# Patient Record
Sex: FEMALE | Race: WHITE | Employment: OTHER | URBAN - METROPOLITAN AREA
[De-identification: names, ages, dates, MRNs, and addresses within clinical notes are randomized per-mention and may not be internally consistent; named-entity substitution may affect disease eponyms.]

---

## 2017-10-25 PROBLEM — F33.1 MODERATE EPISODE OF RECURRENT MAJOR DEPRESSIVE DISORDER (HCC): Status: ACTIVE | Noted: 2017-10-25

## 2017-10-25 PROBLEM — F33.1 MODERATE EPISODE OF RECURRENT MAJOR DEPRESSIVE DISORDER (HCC): Status: RESOLVED | Noted: 2017-10-25 | Resolved: 2017-10-25

## 2017-11-15 ENCOUNTER — HOSPITAL ENCOUNTER (OUTPATIENT)
Dept: GENERAL RADIOLOGY | Age: 82
Discharge: HOME | End: 2017-11-15
Attending: NURSE PRACTITIONER
Payer: MEDICARE
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
## 2017-11-15 DIAGNOSIS — R07.81 RIB PAIN ON LEFT SIDE: ICD-10-CM

## 2017-11-15 DIAGNOSIS — J20.9 ACUTE BRONCHITIS, UNSPECIFIED ORGANISM: ICD-10-CM

## 2017-11-15 PROCEDURE — 71101 X-RAY EXAM UNILAT RIBS/CHEST: CPT

## 2018-01-12 ENCOUNTER — HOSPITAL ENCOUNTER (EMERGENCY)
Age: 83
Discharge: HOME | End: 2018-01-12
Attending: EMERGENCY MEDICINE
Payer: MEDICARE

## 2018-01-12 ENCOUNTER — APPOINTMENT (OUTPATIENT)
Dept: CT IMAGING | Age: 83
End: 2018-01-12
Attending: EMERGENCY MEDICINE
Payer: MEDICARE

## 2018-01-12 ENCOUNTER — APPOINTMENT (OUTPATIENT)
Dept: CT IMAGING | Age: 83
End: 2018-01-12
Payer: MEDICARE

## 2018-01-12 VITALS
SYSTOLIC BLOOD PRESSURE: 218 MMHG | TEMPERATURE: 98.2 F | DIASTOLIC BLOOD PRESSURE: 86 MMHG | BODY MASS INDEX: 40.48 KG/M2 | HEART RATE: 54 BPM | RESPIRATION RATE: 17 BRPM | HEIGHT: 62 IN | OXYGEN SATURATION: 93 % | WEIGHT: 220 LBS

## 2018-01-12 DIAGNOSIS — R19.09 ABDOMINAL MASS OF OTHER SITE: ICD-10-CM

## 2018-01-12 DIAGNOSIS — K42.0 IRREDUCIBLE UMBILICAL HERNIA: Primary | ICD-10-CM

## 2018-01-12 PROBLEM — E66.01 OBESITY, MORBID (HCC): Status: ACTIVE | Noted: 2018-01-12

## 2018-01-12 LAB
ALBUMIN SERPL-MCNC: 3.2 G/DL (ref 3.2–4.6)
ALBUMIN/GLOB SERPL: 0.8 {RATIO} (ref 1.2–3.5)
ALP SERPL-CCNC: 73 U/L (ref 50–136)
ALT SERPL-CCNC: 19 U/L (ref 12–65)
ANION GAP SERPL CALC-SCNC: 9 MMOL/L (ref 7–16)
AST SERPL-CCNC: 47 U/L (ref 15–37)
BASOPHILS # BLD: 0 K/UL (ref 0–0.2)
BASOPHILS NFR BLD: 0 % (ref 0–2)
BILIRUB SERPL-MCNC: 0.3 MG/DL (ref 0.2–1.1)
BUN SERPL-MCNC: 24 MG/DL (ref 8–23)
CALCIUM SERPL-MCNC: 8.4 MG/DL (ref 8.3–10.4)
CHLORIDE SERPL-SCNC: 106 MMOL/L (ref 98–107)
CO2 SERPL-SCNC: 30 MMOL/L (ref 21–32)
CREAT SERPL-MCNC: 1.33 MG/DL (ref 0.6–1)
DIFFERENTIAL METHOD BLD: ABNORMAL
EOSINOPHIL # BLD: 0.2 K/UL (ref 0–0.8)
EOSINOPHIL NFR BLD: 2 % (ref 0.5–7.8)
ERYTHROCYTE [DISTWIDTH] IN BLOOD BY AUTOMATED COUNT: 16.9 % (ref 11.9–14.6)
GLOBULIN SER CALC-MCNC: 3.9 G/DL (ref 2.3–3.5)
GLUCOSE SERPL-MCNC: 98 MG/DL (ref 65–100)
HCT VFR BLD AUTO: 40.9 % (ref 35.8–46.3)
HGB BLD-MCNC: 12.5 G/DL (ref 11.7–15.4)
IMM GRANULOCYTES # BLD: 0.1 K/UL (ref 0–0.5)
IMM GRANULOCYTES NFR BLD AUTO: 1 % (ref 0–5)
LYMPHOCYTES # BLD: 2.1 K/UL (ref 0.5–4.6)
LYMPHOCYTES NFR BLD: 21 % (ref 13–44)
MCH RBC QN AUTO: 27.6 PG (ref 26.1–32.9)
MCHC RBC AUTO-ENTMCNC: 30.6 G/DL (ref 31.4–35)
MCV RBC AUTO: 90.3 FL (ref 79.6–97.8)
MONOCYTES # BLD: 0.8 K/UL (ref 0.1–1.3)
MONOCYTES NFR BLD: 8 % (ref 4–12)
NEUTS SEG # BLD: 6.9 K/UL (ref 1.7–8.2)
NEUTS SEG NFR BLD: 68 % (ref 43–78)
PLATELET # BLD AUTO: 319 K/UL (ref 150–450)
PMV BLD AUTO: 10.2 FL (ref 10.8–14.1)
POTASSIUM SERPL-SCNC: 4.1 MMOL/L (ref 3.5–5.1)
PROT SERPL-MCNC: 7.1 G/DL (ref 6.3–8.2)
RBC # BLD AUTO: 4.53 M/UL (ref 4.05–5.25)
SODIUM SERPL-SCNC: 145 MMOL/L (ref 136–145)
WBC # BLD AUTO: 10.2 K/UL (ref 4.3–11.1)

## 2018-01-12 PROCEDURE — 74011250636 HC RX REV CODE- 250/636: Performed by: EMERGENCY MEDICINE

## 2018-01-12 PROCEDURE — 96376 TX/PRO/DX INJ SAME DRUG ADON: CPT | Performed by: EMERGENCY MEDICINE

## 2018-01-12 PROCEDURE — 96374 THER/PROPH/DIAG INJ IV PUSH: CPT | Performed by: EMERGENCY MEDICINE

## 2018-01-12 PROCEDURE — 99284 EMERGENCY DEPT VISIT MOD MDM: CPT | Performed by: EMERGENCY MEDICINE

## 2018-01-12 PROCEDURE — 74011636320 HC RX REV CODE- 636/320: Performed by: EMERGENCY MEDICINE

## 2018-01-12 PROCEDURE — 74176 CT ABD & PELVIS W/O CONTRAST: CPT

## 2018-01-12 PROCEDURE — 74011636637 HC RX REV CODE- 636/637: Performed by: EMERGENCY MEDICINE

## 2018-01-12 PROCEDURE — A9270 NON-COVERED ITEM OR SERVICE: HCPCS | Performed by: EMERGENCY MEDICINE

## 2018-01-12 PROCEDURE — 74011250637 HC RX REV CODE- 250/637: Performed by: EMERGENCY MEDICINE

## 2018-01-12 PROCEDURE — 85025 COMPLETE CBC W/AUTO DIFF WBC: CPT

## 2018-01-12 PROCEDURE — 80053 COMPREHEN METABOLIC PANEL: CPT

## 2018-01-12 PROCEDURE — 96361 HYDRATE IV INFUSION ADD-ON: CPT | Performed by: EMERGENCY MEDICINE

## 2018-01-12 RX ORDER — ONDANSETRON 2 MG/ML
4 INJECTION INTRAMUSCULAR; INTRAVENOUS
Status: COMPLETED | OUTPATIENT
Start: 2018-01-12 | End: 2018-01-12

## 2018-01-12 RX ORDER — PREDNISONE 20 MG/1
40 TABLET ORAL
Status: COMPLETED | OUTPATIENT
Start: 2018-01-12 | End: 2018-01-12

## 2018-01-12 RX ORDER — METOPROLOL TARTRATE 50 MG/1
50 TABLET ORAL
Status: COMPLETED | OUTPATIENT
Start: 2018-01-12 | End: 2018-01-12

## 2018-01-12 RX ORDER — ONDANSETRON 8 MG/1
8 TABLET, ORALLY DISINTEGRATING ORAL
Qty: 10 TAB | Refills: 0 | Status: SHIPPED | OUTPATIENT
Start: 2018-01-12 | End: 2018-04-20

## 2018-01-12 RX ADMIN — METOPROLOL TARTRATE 50 MG: 50 TABLET ORAL at 21:17

## 2018-01-12 RX ADMIN — ONDANSETRON 4 MG: 2 INJECTION INTRAMUSCULAR; INTRAVENOUS at 17:16

## 2018-01-12 RX ADMIN — PREDNISONE 40 MG: 20 TABLET ORAL at 19:45

## 2018-01-12 RX ADMIN — SODIUM CHLORIDE 1000 ML: 900 INJECTION, SOLUTION INTRAVENOUS at 14:33

## 2018-01-12 RX ADMIN — DIATRIZOATE MEGLUMINE AND DIATRIZOATE SODIUM 15 ML: 660; 100 LIQUID ORAL; RECTAL at 17:06

## 2018-01-12 RX ADMIN — ONDANSETRON 4 MG: 2 INJECTION INTRAMUSCULAR; INTRAVENOUS at 19:45

## 2018-01-12 RX ADMIN — ONDANSETRON 4 MG: 2 INJECTION INTRAMUSCULAR; INTRAVENOUS at 14:34

## 2018-01-12 NOTE — ED TRIAGE NOTES
Pt sent from PCP for incarcerated umbilical hernia. Pt c/o bulge at umbilicus and states it feels hot with fever. She has a known hernia but it has always been reduced but not today. Pt is accompanied by her home care RN.

## 2018-01-12 NOTE — ED PROVIDER NOTES
HPI Comments: Patient states abdominal soreness and nausea. Caregivers report her umbilical hernia has gotten larger the last 1 1/2 weeks and now is not reducible. Went to her PCP today and was sent for incarcerated hernia. Patient denies vomiting. Ate at 1 pm today. No diarrhea. No fever. She denies any history of GYN disease. Has had a cholecystectomy. Patient is a 80 y.o. female presenting with abdominal pain. The history is provided by the patient, medical records and a caregiver. Abdominal Pain    This is a new problem. The pain is located in the generalized abdominal region. Associated symptoms include nausea and arthralgias. Pertinent negatives include no fever, no diarrhea, no vomiting, no dysuria, no frequency and no chest pain. Nothing worsens the pain. The pain is relieved by nothing. The patient's surgical history includes cholecystectomy. Past Medical History:   Diagnosis Date    Coronary atherosclerosis of unspecified type of vessel, native or graft 8/19/2015    Esophageal reflux 8/19/2015    Gastrointestinal disorder     Gout 8/19/2015    Hypertension     Other and unspecified hyperlipidemia 8/19/2015    Other ill-defined conditions(799.89)     palpitations    Pure hypercholesterolemia 8/19/2015       Past Surgical History:   Procedure Laterality Date    HX CHOLECYSTECTOMY      HX HEENT           Family History:   Problem Relation Age of Onset    Heart Disease Mother     Arthritis-osteo Father        Social History     Social History    Marital status:      Spouse name: N/A    Number of children: N/A    Years of education: N/A     Occupational History    Not on file.      Social History Main Topics    Smoking status: Former Smoker    Smokeless tobacco: Never Used    Alcohol use No    Drug use: No    Sexual activity: Not on file     Other Topics Concern    Not on file     Social History Narrative         ALLERGIES: Gold au 198 and Sulfa (sulfonamide antibiotics)    Review of Systems   Constitutional: Negative for appetite change, chills and fever. Respiratory: Negative. Cardiovascular: Positive for leg swelling (chronic). Negative for chest pain. Gastrointestinal: Positive for abdominal pain and nausea. Negative for diarrhea and vomiting. Genitourinary: Negative. Negative for dysuria and frequency. Musculoskeletal: Positive for arthralgias and gait problem. Skin: Negative. Neurological: Negative for weakness. Hematological: Negative. Psychiatric/Behavioral: Negative. Vitals:    01/12/18 1326 01/12/18 1357   BP: 140/53    Pulse: 61    Resp: 17    Temp: 98.2 °F (36.8 °C)    SpO2: 93% 93%   Weight: 99.8 kg (220 lb)    Height: 5' 2\" (1.575 m)             Physical Exam   Constitutional: She is oriented to person, place, and time. She appears well-developed and well-nourished. HENT:   Head: Normocephalic and atraumatic. Mouth/Throat: Oropharynx is clear and moist.   Eyes: Conjunctivae and EOM are normal. Pupils are equal, round, and reactive to light. No scleral icterus. Neck: Normal range of motion. Neck supple. No JVD present. Cardiovascular: Normal rate, regular rhythm, normal heart sounds and intact distal pulses. Pulmonary/Chest: Effort normal and breath sounds normal.   Abdominal: She exhibits mass. There is tenderness. Abdomen with umbilical hernia - blue discoloration 5 cm. Underlying mass lower abdomen and periumbilical area 20 cm. Firm. Some tenderness with no guarding or rebound. Musculoskeletal:   Chronic edema lower extremities. Neurological: She is alert and oriented to person, place, and time. Skin: Skin is warm and dry. Psychiatric: She has a normal mood and affect. Her behavior is normal.   Nursing note and vitals reviewed.        MDM  Number of Diagnoses or Management Options  Abdominal mass of other site:   Irreducible umbilical hernia:   Diagnosis management comments: Care turned over to me pending CT scan with contrast.  Please see Dr. Mayra Nur note for details of presentation. CT scan was obtained and shows no evidence of obstruction or incarcerated hernia. The material in the umbilical hernia appears to be related to a large pelvic mass which is suspicious for ovarian carcinoma. Patient will be given referral to OB/GYN. This has already been arranged by Dr. Clara Maloney. Amount and/or Complexity of Data Reviewed  Tests in the radiology section of CPT®: ordered and reviewed  Independent visualization of images, tracings, or specimens: yes    Risk of Complications, Morbidity, and/or Mortality  Presenting problems: moderate  Diagnostic procedures: moderate  Management options: moderate      ED Course       Procedures    Abdominal mass  Umbilical hernia  CT - large cystic mass and umbilical hernia. Unclear if incarcerated bowel. Discussed with Dr. Melanie Ramirez - repeat scan with oral contrast. Will admit if obstructed. Requests GYN consult as well for outpatient evaluation of mass. Discussed with Dr. Jordy Palma. She will follow up in office if patient not admitted.    Labs reviewed  Zofran 4 mg IV x 2  Oral contrast.   Care signed to Dr. Nadine Sanford

## 2018-01-13 NOTE — ED NOTES
MD made aware of pt's asymptomatic blood pressure at discharge. MD ordered Lopressor and it was given to the pt prior to discharge.

## 2018-01-13 NOTE — DISCHARGE INSTRUCTIONS
Ovarian Cancer: Care Instructions  Your Care Instructions  Ovarian cancer occurs when abnormal cells grow out of control in or near your ovaries. These cells may spread to other areas in the body. The ovaries are the organs that hold and release a woman's eggs. Treatment usually involves surgery to remove the ovaries. Chemotherapy may also be used. Radiation therapy is rarely used, but in some cases it might be part of treatment. You also may get medicines to help with the side effects of chemotherapy, which may include nausea, vomiting, and fatigue. Finding out that you have cancer is scary. You may feel many emotions and may need some help coping. Seek out family, friends, and counselors for support. You can do things at home to make yourself feel better while you go through treatment. You also can call the [facility] (5-324.614.2371) or visit its website at [employer] for more information. Follow-up care is a key part of your treatment and safety. Be sure to make and go to all appointments, and call your doctor if you are having problems. It's also a good idea to know your test results and keep a list of the medicines you take. How can you care for yourself at home? · Take your medicines exactly as prescribed. Call your doctor if you think you are having a problem with your medicine. You may get medicine for nausea and vomiting if you have these side effects. · Eat healthy food. If you do not feel like eating, try to eat food that has protein and extra calories to keep up your strength and prevent weight loss. Drink liquid meal replacements for extra calories and protein. Try to eat your main meal early. Eating smaller portions more often may help as well. · Get some physical activity every day, but do not get too tired. Keep doing the hobbies you enjoy as your energy allows. · Take steps to control your stress and workload. Learn relaxation techniques. ¨ Share your feelings.  Stress and tension affect our emotions. By expressing your feelings to others, you may be able to understand and cope with them. ¨ Consider joining a support group. Talking about a problem with your spouse, a good friend, or other people with similar problems is a good way to reduce tension and stress. ¨ Express yourself through art. Try writing, dance, art, or crafts to relieve tension. Some dance, writing, or art groups may be available just for people who have cancer. ¨ Be kind to your body and mind. Getting enough sleep, eating a healthy diet, and taking time to do things you enjoy can contribute to an overall feeling of balance in your life and help reduce stress. ¨ Get help if you need it. Discuss your concerns with your doctor or counselor. · If you are vomiting or have diarrhea:  ¨ Drink plenty of fluids (enough so that your urine is light yellow or clear like water) to prevent dehydration. Choose water and other caffeine-free clear liquids. If you have kidney, heart, or liver disease and have to limit fluids, talk with your doctor before you increase the amount of fluids you drink. ¨ When you are able to eat, try clear soups, mild foods, and liquids until all symptoms are gone for 12 to 48 hours. Other good choices include dry toast, crackers, cooked cereal, and gelatin dessert, such as Jell-O.  · Take care of your urinary tract to prevent problems such as infection, which can be caused by ovarian cancer and its treatment. Limit drinks with caffeine, drink plenty of fluids, and urinate every 3 or 4 hours. · If you have not already done so, prepare a list of advance directives. Advance directives are instructions to your doctor and family members about what kind of care you want if you become unable to speak or express yourself. When should you call for help? Call 911 anytime you think you may need emergency care. For example, call if:  ? · You passed out (lost consciousness).    ?Call your doctor now or seek immediate medical care if:  ? · You have a fever. ? · You have abnormal bleeding. ? · You have new or worse pain. ? · You think you have an infection. ? · You have new symptoms, such as a cough, belly pain, vomiting, diarrhea, or a rash. ? Watch closely for changes in your health, and be sure to contact your doctor if:  ? · You are much more tired than usual.   ? · You have swollen glands in your armpits, groin, or neck. ? · You do not get better as expected. Where can you learn more? Go to http://ena-bobby.info/. Enter D145 in the search box to learn more about \"Ovarian Cancer: Care Instructions. \"  Current as of: May 12, 2017  Content Version: 11.4  © 4613-9494 [employer]. Care instructions adapted under license by [employer] (which disclaims liability or warranty for this information). If you have questions about a medical condition or this instruction, always ask your healthcare professional. [address] any warranty or liability for your use of this information.

## 2018-01-13 NOTE — ED NOTES
I have reviewed discharge instructions with the patient. The patient verbalized understanding. Patient left ED via Discharge Method: wheelchair to Home with family. Opportunity for questions and clarification provided. Patient given 1 script. To continue your aftercare when you leave the hospital, you may receive an automated call from our care team to check in on how you are doing. This is a free service and part of our promise to provide the best care and service to meet your aftercare needs.  If you have questions, or wish to unsubscribe from this service please call [phone]. Thank you for Choosing our [facility] Emergency Department.

## 2018-01-22 PROBLEM — R19.00 PELVIC MASS: Status: ACTIVE | Noted: 2018-01-22

## 2018-01-24 ENCOUNTER — HOSPITAL ENCOUNTER (OUTPATIENT)
Dept: LAB | Age: 83
Discharge: HOME | End: 2018-01-24
Payer: MEDICARE

## 2018-01-24 DIAGNOSIS — R60.9 EDEMA, UNSPECIFIED TYPE: ICD-10-CM

## 2018-01-24 PROBLEM — Z01.810 PRE-OPERATIVE CARDIOVASCULAR EXAMINATION: Status: ACTIVE | Noted: 2018-01-24

## 2018-01-24 PROBLEM — R01.1 SYSTOLIC MURMUR: Status: ACTIVE | Noted: 2018-01-24

## 2018-01-24 PROBLEM — F03.90 DEMENTIA WITHOUT BEHAVIORAL DISTURBANCE (HCC): Status: ACTIVE | Noted: 2018-01-24

## 2018-01-24 PROBLEM — R19.00 ABDOMINAL LUMP: Status: ACTIVE | Noted: 2018-01-24

## 2018-01-24 LAB — BNP SERPL-MCNC: 37 PG/ML

## 2018-01-24 PROCEDURE — 36415 COLL VENOUS BLD VENIPUNCTURE: CPT | Performed by: INTERNAL MEDICINE

## 2018-01-24 PROCEDURE — 83880 ASSAY OF NATRIURETIC PEPTIDE: CPT | Performed by: INTERNAL MEDICINE

## 2018-02-22 ENCOUNTER — HOSPITAL ENCOUNTER (OUTPATIENT)
Dept: CT IMAGING | Age: 83
Discharge: HOME | End: 2018-02-22
Payer: MEDICARE

## 2018-02-22 VITALS
TEMPERATURE: 98.4 F | SYSTOLIC BLOOD PRESSURE: 150 MMHG | DIASTOLIC BLOOD PRESSURE: 73 MMHG | BODY MASS INDEX: 39.2 KG/M2 | RESPIRATION RATE: 18 BRPM | HEIGHT: 62 IN | HEART RATE: 80 BPM | OXYGEN SATURATION: 98 % | WEIGHT: 213 LBS

## 2018-02-22 DIAGNOSIS — R19.00 PELVIC MASS IN FEMALE: ICD-10-CM

## 2018-02-22 PROCEDURE — 77012 CT SCAN FOR NEEDLE BIOPSY: CPT

## 2018-02-22 PROCEDURE — 88305 TISSUE EXAM BY PATHOLOGIST: CPT | Performed by: OBSTETRICS & GYNECOLOGY

## 2018-02-22 PROCEDURE — 74011000250 HC RX REV CODE- 250: Performed by: RADIOLOGY

## 2018-02-22 PROCEDURE — 88173 CYTOPATH EVAL FNA REPORT: CPT | Performed by: OBSTETRICS & GYNECOLOGY

## 2018-02-22 RX ORDER — LIDOCAINE HYDROCHLORIDE 20 MG/ML
20-200 INJECTION, SOLUTION INFILTRATION; PERINEURAL
Status: DISCONTINUED | OUTPATIENT
Start: 2018-02-22 | End: 2018-02-26 | Stop reason: HOSPADM

## 2018-02-22 RX ORDER — SODIUM CHLORIDE 9 MG/ML
25 INJECTION, SOLUTION INTRAVENOUS CONTINUOUS
Status: DISCONTINUED | OUTPATIENT
Start: 2018-02-22 | End: 2018-02-26 | Stop reason: HOSPADM

## 2018-02-22 RX ADMIN — SODIUM BICARBONATE 2 ML: 0.2 INJECTION, SOLUTION INTRAVENOUS at 12:35

## 2018-02-22 RX ADMIN — LIDOCAINE HYDROCHLORIDE 160 MG: 20 INJECTION, SOLUTION INFILTRATION; PERINEURAL at 12:35

## 2018-02-22 NOTE — PROCEDURES
Date of Procedure: 2/22/2018    Pre-Procedure Diagnosis: Large cystic ovarian mass causing discomfort. Patient presents for palliative CAT scan guided drainage. Post-Procedure Diagnosis: SAME    Procedure(s): CAT scan guided drainage of the large pelvic cystic mass. Brief Description of Procedure: As above    Performed By: Barrera De Paz MD     Assistants: None    Anesthesia: Local    Estimated Blood Loss: Minimal    Specimens: Thin brown fluid sample sent to laboratory for cytology    Implants: None    Findings: 2.8 L of fluid was removed.     Complications: None    Recommendations: None    Follow Up: None

## 2018-02-22 NOTE — DISCHARGE INSTRUCTIONS
[address]  Department of Interventional Radiology  [facility]  [phone] Office  [phone] Fax    FLUID ASPIRATION DISCHARGE INSTRUCTIONS    General Information:  During this procedure, the doctor will insert a needle into the abdomen to drain fluid. After the procedure, you will be able to take a deep breath much easier. The site of the puncture may ooze the first day. This will decrease and eventually stop. Paracentesis (draining fluid from the abdomen) sometimes makes patients hypotensive (low blood pressure). Your doctor may order for you to receive fluids or albumin (a volume booster) during the procedure through an IV site. Home Care Instructions:  Keep the puncture site clean and dry. No tub baths or swimming until puncture site heals. Showering is acceptable. Resume your normal diet, and resume your normal activity slowly and as you tolerate. If you are short of breath, rest. If shortness of breath does not ease, please call your ordering doctor. Fluid can re-accumulate in the chest and/or in the abdomen. If this should occur, your doctor needs to know as you may need to have the procedure done again. Call If:     You should call your Physician and/or the Radiology Nurse if you notice any signs of infection, like pus draining, or if it is swollen or reddened. Also call if you have a fever, or if you are bleeding from the puncture site more than a small amount on the dressing. Call if the puncture site keeps draining fluid. Some oozing is to be expected, but should slow and then stop. Call if you feel like you have pressure in your abdomen. SEEK IMMEDIATE CARE OR CALL 911 IF YOU SUDDENLY HAVE TROUBLE BREATHING, OR IF YOUR LIPS TURN BLUE, OR IF YOU NOTICE BLOOD IN YOUR SPUTUM. Follow-Up Instructions: Please see your ordering doctor as he/she has requested. To Reach Us:     If you have any questions about your procedure, please call the Interventional Radiology department at [phone]. After business hours (5pm) and weekends, call the answering service at [phone] and ask for the Radiologist on call to be paged. Interventional Radiology General Nurse Discharge    After general anesthesia or intravenous sedation, for 24 hours or while taking prescription Narcotics:  · Limit your activities  · Do not drive and operate hazardous machinery  · Do not make important personal or business decisions  · Do  not drink alcoholic beverages  · If you have not urinated within 8 hours after discharge, please contact your surgeon on call. * Please give a list of your current medications to your Primary Care Provider. * Please update this list whenever your medications are discontinued, doses are     changed, or new medications (including over-the-counter products) are added. * Please carry medication information at all times in case of emergency situations. These are general instructions for a healthy lifestyle:    No smoking/ No tobacco products/ Avoid exposure to second hand smoke  Surgeon General's Warning:  Quitting smoking now greatly reduces serious risk to your health. Obesity, smoking, and sedentary lifestyle greatly increases your risk for illness  A healthy diet, regular physical exercise & weight monitoring are important for maintaining a healthy lifestyle    You may be retaining fluid if you have a history of heart failure or if you experience any of the following symptoms:  Weight gain of 3 pounds or more overnight or 5 pounds in a week, increased swelling in our hands or feet or shortness of breath while lying flat in bed. Please call your doctor as soon as you notice any of these symptoms; do not wait until your next office visit.     Recognize signs and symptoms of STROKE:  F-face looks uneven    A-arms unable to move or move unevenly    S-speech slurred or non-existent    T-time-call 911 as soon as signs and symptoms begin-DO NOT go       Back to bed or wait to see if you get better-TIME IS BRAIN. Date: 2/22/2018  Discharging Nurse:  Lenka Laird RN

## 2018-02-22 NOTE — PROGRESS NOTES
Discharge complete. Discharge instructions reviewed with pt. Time for questions allowed. Pt denies any questions at this time. Dressing to abd noted to be clean, dry, and intact. Pt assisted to front of hospital via [facility] accompanied by family.      Visit Vitals    /73 (BP 1 Location: Left arm, BP Patient Position: At rest;Supine)    Pulse 80    Temp 98.4 °F (36.9 °C)    Resp 18    Ht 5' 2\" (1.575 m)    Wt 96.6 kg (213 lb)    SpO2 98%    Breastfeeding No    BMI 38.96 kg/m2

## 2018-02-22 NOTE — PROGRESS NOTES
TRANSFER - OUT REPORT:    Verbal report given to [facility], RN (name) on Ethan Vargas  being transferred to IR recovery (unit) for routine progression of care       Report consisted of patients Situation, Background, Assessment and   Recommendations(SBAR). Information from the following report(s) Procedure Summary, Intake/Output and MAR was reviewed with the receiving nurse. Lines:       Opportunity for questions and clarification was provided.       Patient transported with:   Registered Nurse

## 2018-02-22 NOTE — PROGRESS NOTES
TRANSFER - IN REPORT:    Verbal report received from Angelic RN(name) on [employer]  being received from IR(unit) for routine progression of care      Report consisted of patients Situation, Background, Assessment and   Recommendations(SBAR). Information from the following report(s) Procedure Summary and MAR was reviewed with the receiving nurse. Opportunity for questions and clarification was provided. Assessment completed upon patients arrival to unit and care assumed.

## 2018-02-22 NOTE — IP AVS SNAPSHOT
Cortney Day 
 
 
 [address] 
[phone] Patient: Magalys Oleary 
MRN: [id number] About your hospitalization You were admitted on:  February 22, 2018 You last received care in the:  [facility] RADIOLOGY CT SCAN You were discharged on:  February 22, 2018 Why you were hospitalized Your primary diagnosis was:  Not on File Follow-up Information None Discharge Orders None A check douglas indicates which time of day the medication should be taken. My Medications ASK your doctor about these medications Instructions Each Dose to Equal  
 Morning Noon Evening Bedtime JDUI-SELTZER PLUS DAY PO Your last dose was: Your next dose is: Take  by mouth every four (4) hours as needed. allopurinol 100 mg tablet Commonly known as:  Sonido Green Your last dose was: Your next dose is: Take 1 Tab by mouth two (2) times a day. 100 mg  
    
   
   
   
  
 aspirin 81 mg chewable tablet Your last dose was: Your next dose is: Take 81 mg by mouth daily. 81 mg  
    
   
   
   
  
 buPROPion  mg tablet Commonly known as:  Vergibart Boyd Your last dose was: Your next dose is: Take 1 Tab by mouth every morning. 150 mg  
    
   
   
   
  
 citalopram 20 mg tablet Commonly known as:  Anne Suazo Your last dose was: Your next dose is: Take 1 Tab by mouth daily. 20 mg  
    
   
   
   
  
 Citracal + D tablet Generic drug:  calcium citrate-vitamin D3 Your last dose was: Your next dose is: Take 1 Tab by mouth daily as needed. 1 Tab  
    
   
   
   
  
 clonazePAM 0.5 mg tablet Commonly known as:  Adrianne Rodriguezters Your last dose was: Your next dose is: Take 1 Tab by mouth two (2) times a day. Max Daily Amount: 1 mg. 0.5 mg  
    
   
   
   
  
 diphenoxylate-atropine 2.5-0.025 mg per tablet Commonly known as:  LOMOTIL Your last dose was: Your next dose is: Take 1 Tab by mouth four (4) times daily as needed for Diarrhea. Max Daily Amount: 4 Tabs. 1 Tab  
    
   
   
   
  
 donepezil 10 mg tablet Commonly known as:  ARICEPT Your last dose was: Your next dose is: Take 1 Tab by mouth nightly. 10 mg  
    
   
   
   
  
 ergocalciferol 50,000 unit capsule Commonly known as:  ERGOCALCIFEROL Your last dose was: Your next dose is: Take 1 Cap by mouth every seven (7) days. [address]014 Units  
    
   
   
   
  
 fluticasone 50 mcg/actuation nasal spray Commonly known as:  Wiseman Finders Your last dose was: Your next dose is: Take 1 spray in each nostril daily  
     
   
   
   
  
 furosemide 80 mg tablet Commonly known as:  LASIX Your last dose was: Your next dose is: Take 1 Tab by mouth daily. 80 mg  
    
   
   
   
  
 ibandronate 150 mg tablet Commonly known as:  Paradise Phlegm Your last dose was: Your next dose is: Take 150 mg by mouth every thirty (30) days. 150 mg  
    
   
   
   
  
 lisinopril 40 mg tablet Commonly known as:  Metta Lawman Your last dose was: Your next dose is: Take 1 Tab by mouth daily. 40 mg  
    
   
   
   
  
 metoprolol succinate 50 mg XL tablet Commonly known as:  TOPROL-XL Your last dose was: Your next dose is: Take 1 Tab by mouth daily. 50 mg  
    
   
   
   
  
 nystatin powder Commonly known as:  MYCOSTATIN Your last dose was: Your next dose is:    
   
   
 Apply  to affected area three (3) times daily. omeprazole 40 mg capsule Commonly known as:  PRILOSEC Your last dose was: Your next dose is: Take 1 Cap by mouth daily. 40 mg  
    
   
   
   
  
 ondansetron 8 mg disintegrating tablet Commonly known as:  ZOFRAN ODT Your last dose was: Your next dose is: Take 1 Tab by mouth every twelve (12) hours as needed for Nausea. 8 mg  
    
   
   
   
  
 potassium chloride SA 10 mEq capsule Commonly known as:  Cindia Myriam Your last dose was: Your next dose is: TAKE ONE CAPSULE BY MOUTH EVERY DAY FOR 90 DAYS  
     
   
   
   
  
 TYLENOL 325 mg tablet Generic drug:  acetaminophen Your last dose was: Your next dose is: Take 325 mg by mouth every six (6) hours as needed for Pain. 325 mg ULTRA-LIGHT ROLLATOR Misc Generic drug:  Vincent Arnegard Your last dose was: Your next dose is:    
   
   
 by Does Not Apply route. Sig: Use as directed ZADITOR 0.025 % (0.035 %) ophthalmic solution Generic drug:  ketotifen Your last dose was: Your next dose is:    
   
   
 Administer 1 Drop to both eyes daily as needed. 1 Drop Discharge Instructions [address] [place] [address] Department of Interventional Radiology [facility] 
[phone] Office 
[phone] Fax FLUID ASPIRATION DISCHARGE INSTRUCTIONS General Information: 
During this procedure, the doctor will insert a needle into the abdomen to drain fluid. After the procedure, you will be able to take a deep breath much easier. The site of the puncture may ooze the first day. This will decrease and eventually stop. Paracentesis (draining fluid from the abdomen) sometimes makes patients hypotensive (low blood pressure).  Your doctor may order for you to receive fluids or albumin (a volume booster) during the procedure through an IV site. Home Care Instructions: 
Keep the puncture site clean and dry. No tub baths or swimming until puncture site heals. Showering is acceptable. Resume your normal diet, and resume your normal activity slowly and as you tolerate. If you are short of breath, rest. If shortness of breath does not ease, please call your ordering doctor. Fluid can re-accumulate in the chest and/or in the abdomen. If this should occur, your doctor needs to know as you may need to have the procedure done again. Call If: 
   You should call your Physician and/or the Radiology Nurse if you notice any signs of infection, like pus draining, or if it is swollen or reddened. Also call if you have a fever, or if you are bleeding from the puncture site more than a small amount on the dressing. Call if the puncture site keeps draining fluid. Some oozing is to be expected, but should slow and then stop. Call if you feel like you have pressure in your abdomen. SEEK IMMEDIATE CARE OR CALL 911 IF YOU SUDDENLY HAVE TROUBLE BREATHING, OR IF YOUR LIPS TURN BLUE, OR IF YOU NOTICE BLOOD IN YOUR SPUTUM. Follow-Up Instructions: Please see your ordering doctor as he/she has requested. To Reach Us: If you have any questions about your procedure, please call the Interventional Radiology department at [phone]. After business hours (5pm) and weekends, call the answering service at [phone] and ask for the Radiologist on call to be paged. Interventional Radiology General Nurse Discharge After general anesthesia or intravenous sedation, for 24 hours or while taking prescription Narcotics: · Limit your activities · Do not drive and operate hazardous machinery · Do not make important personal or business decisions · Do  not drink alcoholic beverages · If you have not urinated within 8 hours after discharge, please contact your surgeon on call. * Please give a list of your current medications to your Primary Care Provider. * Please update this list whenever your medications are discontinued, doses are 
   changed, or new medications (including over-the-counter products) are added. * Please carry medication information at all times in case of emergency situations. These are general instructions for a healthy lifestyle: No smoking/ No tobacco products/ Avoid exposure to second hand smoke Surgeon General's Warning:  Quitting smoking now greatly reduces serious risk to your health. Obesity, smoking, and sedentary lifestyle greatly increases your risk for illness A healthy diet, regular physical exercise & weight monitoring are important for maintaining a healthy lifestyle You may be retaining fluid if you have a history of heart failure or if you experience any of the following symptoms:  Weight gain of 3 pounds or more overnight or 5 pounds in a week, increased swelling in our hands or feet or shortness of breath while lying flat in bed. Please call your doctor as soon as you notice any of these symptoms; do not wait until your next office visit. Recognize signs and symptoms of STROKE: 
F-face looks uneven A-arms unable to move or move unevenly S-speech slurred or non-existent T-time-call 911 as soon as signs and symptoms begin-DO NOT go Back to bed or wait to see if you get better-TIME IS BRAIN. Date: 2/22/2018 Discharging Nurse: Dariana Gomez RN 
 
 
 
 
[employer] Transitions of Care Introducing [employer] [address] offers a voluntary care coordination program to provide high quality service and care to [facility] fee-for-service beneficiaries. Luther Guerrero was designed to help you enhance your health and well-being through the following services: ? Transitions of Care  support for individuals who are transitioning from one care setting to another (example: Hospital to home). ? Chronic and Complex Care Coordination  support for individuals and caregivers of those with serious or chronic illnesses or with more than one chronic (ongoing) condition and those who take a number of different medications. If you meet specific medical criteria, a [address] may call you directly to coordinate your care with your primary care physician and your other care providers. For questions about the [facility] programs, please, contact your physicians office. For general questions or additional information about Accountable Care Organizations: 
Please visit www.medicare.gov/acos. html or call 1-800-MEDICARE (1-129.897.7359) TTY users should call 6-722.304.7718. Introducing [address]! [facility] introduces The [employer] patient portal. Now you can access parts of your medical record, email your doctor's office, and request medication refills online. 1. In your internet browser, go to [email or web address] 2. Click on the First Time User? Click Here link in the Sign In box. You will see the New Member Sign Up page. 3. Enter your [employer] Access Code exactly as it appears below. You will not need to use this code after youve completed the sign-up process. If you do not sign up before the expiration date, you must request a new code. · [employer] Access Code: [email or web address] Expires: 4/23/2018  2:50 PM 
 
4. Enter the last four digits of your Social Security Number (xxxx) and Date of Birth (mm/dd/yyyy) as indicated and click Submit. You will be taken to the next sign-up page. 5. Create a [employer] ID. This will be your [employer] login ID and cannot be changed, so think of one that is secure and easy to remember. 6. Create a [employer] password. You can change your password at any time. 7. Enter your Password Reset Question and Answer.  This can be used at a later time if you forget your password. 8. Enter your e-mail address. You will receive e-mail notification when new information is available in [address] 9. Click Sign Up. You can now view and download portions of your medical record. 10. Click the Download Summary menu link to download a portable copy of your medical information. If you have questions, please visit the Frequently Asked Questions section of the [employer] website. Remember, [employer] is NOT to be used for urgent needs. For medical emergencies, dial 911. Now available from your iPhone and Android! Your Primary Care Physician (PCP) Primary Care Physician Office Phone Office Fax Nataly Powell [phone] You are allergic to the following Allergen Reactions Gold Au 198 Unknown (comments) Sulfa (Sulfonamide Antibiotics) Unknown (comments) Recent Documentation Height Weight Breastfeeding? BMI OB Status Smoking Status 1.575 m 96.6 kg No 38.96 kg/m2 Postmenopausal Former Smoker Emergency Contacts Name Discharge Info Relation Home Work Mobile Kade Alarcon  Child [2] [phone] Hunter Cheunga  Child [2] [phone] Patient Belongings The following personal items are in your possession at time of discharge: 
     Visual Aid: Glasses Please provide this summary of care documentation to your next provider. Signatures-by signing, you are acknowledging that this After Visit Summary has been reviewed with you and you have received a copy. Patient Signature:  ____________________________________________________________ Date:  ____________________________________________________________  
  
Russ Johansen Provider Signature:  ____________________________________________________________ Date:  ____________________________________________________________

## 2018-03-07 ENCOUNTER — HOSPITAL ENCOUNTER (OUTPATIENT)
Dept: PHYSICAL THERAPY | Age: 83
Discharge: HOME | End: 2018-03-07
Payer: MEDICARE

## 2018-03-07 PROCEDURE — 97166 OT EVAL MOD COMPLEX 45 MIN: CPT

## 2018-03-07 PROCEDURE — G8991 OTHER PT/OT GOAL STATUS: HCPCS

## 2018-03-07 PROCEDURE — G8990 OTHER PT/OT CURRENT STATUS: HCPCS

## 2018-03-07 PROCEDURE — 97140 MANUAL THERAPY 1/> REGIONS: CPT

## 2018-03-07 NOTE — PROGRESS NOTES
Ambulatory/Rehab Services H2 Model Falls Risk Assessment    Risk Factor Pts. ·   Confusion/Disorientation/Impulsivity  []    4 ·   Symptomatic Depression  []   2 ·   Altered Elimination  []   1 ·   Dizziness/Vertigo  []   1 ·   Gender (Male)  []   1 ·   Any administered antiepileptics (anticonvulsants):  []   2 ·   Any administered benzodiazepines:  []   1 ·   Visual Impairment (specify):  []   1 ·   Portable Oxygen Use  []   1 ·   Orthostatic ? BP  []   1 ·   History of Recent Falls (within 3 mos.)  []   5     Ability to Rise from Chair (choose one) Pts. ·   Ability to rise in a single movement  []   0 ·   Pushes up, successful in one attempt  []   1 ·   Multiple attempts, but successful  [x]   3 ·   Unable to rise without assistance  []   4   Total: (5 or greater = High Risk) 3     Falls Prevention Plan:   []                Physical Limitations to Exercise (specify):   []                Mobility Assistance Device (type):   []                Exercise/Equipment Adaptation (specify):    ©2010 [facility] Patent #[id number].  Federal Law prohibits the replication, distribution or use without written permission from [facility]

## 2018-03-07 NOTE — THERAPY EVALUATION
Tanisha Albert  : 1934  [place]  [place] 1960 [address] Therapy  [address], [facility], [address]  [phone]   [phone]         OUTPATIENT OCCUPATIONAL THERAPY: Initial Assessment and Daily Note 3/7/2018    ICD-10: Treatment Diagnosis: I 89.0, lymphedema not elsewhere specified; R26.2, difficulty walking; R53.81, debility, unspecified  Precautions/Allergies:   Gold au 198 and Sulfa (sulfonamide antibiotics)   Fall Risk Score: 3 (? 5 = High Risk)  MD Orders: eval and treat MEDICAL/REFERRING DIAGNOSIS:   Localized edema [R60.0]   DATE OF ONSET: Over 5 years   REFERRING PHYSICIAN: Aretha Valverde MD  RETURN PHYSICIAN APPOINTMENT: to be determined      INITIAL ASSESSMENT:  Ms. Faisal Bella presents with bilateral lower extremity lymphedema, managed efficiently at home with compression wraps. Pt is in need of new wraps and is here today for fitting. Legs with 2+/5 pitting edema in calves and feet, and 1+/5 in ankles. She is wearing CircAid garments at home but arrived without any compression. Pt is here with her home health nurse coordinator. PLAN OF CARE:   PROBLEM LIST:  1. Edema/Girth  2. Compression garment fitting INTERVENTIONS PLANNED  1. Skin care  2. Compression bandaging  3. Fitting for compression garment(s)  4. Patient Education     TREATMENT PLAN:  Effective Dates: 3/7/18 TO 18. Frequency/Duration: one to two visits in 30 days and upon reassessment. Will adjust frequency and duration as progress indicates. GOALS: (Goals have been discussed and agreed upon with patient.)  Short-Term Functional Goals: Time Frame: 15 days  1. Pt will be measured/fitted for compression garments for long term management of bilateral lower extremity lymphedema. Discharge Goals: Time Frame:30 days   1. The patient/caregiver will be independent with home management of lymphedema.     3. Patient/caregiver will be independent donning and doffing bilateral lower extremity compression garments. Rehabilitation Potential For Stated Goals: Good  Regarding Cookie Padilla's therapy, I certify that the treatment plan above will be carried out by a therapist or under their direction. Thank you for this referral,  Ashley Carbajal, OTR/L, CLT     Referring Physician Signature: Xiomara العلي MD _________________________  Date _________            The information in this section was collected on 3/7/2018   (except where otherwise noted). OCCUPATIONAL PROFILE & HISTORY:   History of Present Injury/Illness (Reason for Referral):   Ms. Anamaria Silva presents with bilateral lower extremity lymphedema, managed efficiently at home with compression wraps. Pt is in need of new wraps and is here today for fitting. Legs with 2+/5 pitting edema in calves and feet, and 1+/5 in ankles. She is wearing CircAid garments at home but arrived without any compression. Pt is here with her home health nurse coordinator. Past Medical History/Comorbidities:   Ms. Anamaria Silva  has a past medical history of CHF (congestive heart failure) ([facility] [address] Coronary atherosclerosis of unspecified type of vessel, native or graft (8/19/2015); Esophageal reflux (8/19/2015); Gastrointestinal disorder; Gout (8/19/2015); Hypertension; Other and unspecified hyperlipidemia (8/19/2015); Other ill-defined conditions(799.89); and Pure hypercholesterolemia (8/19/2015). Ms. Anamaria Silva  has a past surgical history that includes hx cholecystectomy and hx heent. Social History/Living Environment:   Pt lives with her  of 58 years. They have 24/7 in home care. Prior Level of Function/Work/Activity:    Per above  Previous Treatment Approaches:         Pt has had complete decongestive therapy in the past few years and is here to reorder compression garments for her legs.   She is dependent on donning and doffing garments  Current Medications:    Current Outpatient Prescriptions:     donepezil (ARICEPT) 10 mg tablet, Take 1 Tab by mouth nightly., Disp: 15 Tab, Rfl: 0    calcium citrate-vitamin D3 (CITRACAL + D) tablet, Take 1 Tab by mouth daily as needed. , Disp: , Rfl:     aspirin 81 mg chewable tablet, Take 81 mg by mouth daily. , Disp: , Rfl:     acetaminophen (TYLENOL) 325 mg tablet, Take 325 mg by mouth every six (6) hours as needed for Pain., Disp: , Rfl:     D-METHORPHAN/PE/ACETAMINOPHEN (JUDI-SELTZER PLUS DAY PO), Take  by mouth every four (4) hours as needed. , Disp: , Rfl:     ketotifen (ZADITOR) 0.025 % (0.035 %) ophthalmic solution, Administer 1 Drop to both eyes daily as needed. , Disp: , Rfl:     nystatin (MYCOSTATIN) powder, Apply  to affected area three (3) times daily. , Disp: 60 g, Rfl: 2    ondansetron (ZOFRAN ODT) 8 mg disintegrating tablet, Take 1 Tab by mouth every twelve (12) hours as needed for Nausea., Disp: 10 Tab, Rfl: 0    lisinopril (PRINIVIL, ZESTRIL) 40 mg tablet, Take 1 Tab by mouth daily. , Disp: 90 Tab, Rfl: 3    omeprazole (PRILOSEC) 40 mg capsule, Take 1 Cap by mouth daily. , Disp: 90 Cap, Rfl: 3    citalopram (CELEXA) 20 mg tablet, Take 1 Tab by mouth daily. , Disp: 90 Tab, Rfl: 3    potassium chloride SA (MICRO-K) 10 mEq capsule, TAKE ONE CAPSULE BY MOUTH EVERY DAY FOR 90 DAYS, Disp: 90 Cap, Rfl: 3    metoprolol succinate (TOPROL-XL) 50 mg XL tablet, Take 1 Tab by mouth daily. , Disp: 90 Tab, Rfl: 3    buPROPion XL (WELLBUTRIN XL) 150 mg tablet, Take 1 Tab by mouth every morning., Disp: 90 Tab, Rfl: 3    ibandronate (BONIVA) 150 mg tablet, Take 150 mg by mouth every thirty (30) days. , Disp: 3 Tab, Rfl: 3    ergocalciferol (ERGOCALCIFEROL) 50,000 unit capsule, Take 1 Cap by mouth every seven (7) days. , Disp: 13 Cap, Rfl: 3    clonazePAM (KLONOPIN) 0.5 mg tablet, Take 1 Tab by mouth two (2) times a day. Max Daily Amount: 1 mg., Disp: 180 Tab, Rfl: 1    allopurinol (ZYLOPRIM) 100 mg tablet, Take 1 Tab by mouth two (2) times a day. , Disp: 180 Tab, Rfl: 3    furosemide (LASIX) 80 mg tablet, Take 1 Tab by mouth daily. , Disp: 90 Tab, Rfl: 3    fluticasone (FLONASE) 50 mcg/actuation nasal spray, Take 1 spray in each nostril daily, Disp: 3 Bottle, Rfl: 3    diphenoxylate-atropine (LOMOTIL) 2.5-0.025 mg per tablet, Take 1 Tab by mouth four (4) times daily as needed for Diarrhea. Max Daily Amount: 4 Tabs., Disp: 90 Tab, Rfl: 0    Walker (ULTRA-LIGHT ROLLATOR) misc, by Does Not Apply route. Sig: Use as directed, Disp: , Rfl:             Date Last Reviewed:  3/7/2018     Complexity Level : Expanded review of therapy/medical records (1-2):  MODERATE COMPLEXITY   ASSESSMENT OF OCCUPATIONAL PERFORMANCE:   Palpation:          Pitting 2+/5 lower leg lymphedema. No tissue  Changed observed  Skin Integrity:          Hyperpigmentation, skin tightness     Edema/Girth:  2+ and pitting    Left Right    Initial Most Recent Initial Most Recent   Lower  Extremity 148.0 cm   144.5 cm         Physical Skills Involved:  1. Edema  2. Skin Integrity  3. Hyperpigmentation Cognitive Skills Affected (resulting in the inability to perform in a timely and safe manner):  1. Short Term Recall Psychosocial Skills Affected:  1. Habits/Routines   Number of elements that affect the Plan of Care: 3-5:  MODERATE COMPLEXITY   CLINICAL DECISION MAKING:   Outcome Measure: Tool Used: Lymphedema Life Impact Scale   Score:  Initial: 35 Most Recent: X (Date: -- )   Interpretation of Score: The Lymphedema Life Impact Scale (LLIS) is a validated instrument that measures the physical, functional, and psychosocial concerns pertinent to patients with extremity lymphedema. The Scale's questionnaire is administered to patients to gauge impairments, activity limitations, and participation restrictions resulting from their lymphedema. Score 0 1-13 14-26 27-40 41-54 55-67 68   Modifier CH CI CJ CK CL CM CN     ?  Other PT/OT Primary Functional Limitations:     - CURRENT STATUS: CK - 40%-59% impaired, limited or restricted    - GOAL STATUS: CJ - 20%-39% impaired, limited or restricted    - D/C STATUS:  ---------------To be determined---------------         Medical Necessity:   · Patient is expected to demonstrate progress in edema reduction to reduce risk of cellulitis. Reason for Services/Other Comments:  · Pt will require ongoing service if caregiver training is required for compression management. Use of outcome tool(s) and clinical judgement create a POC that gives a: Questionable prediction of patient's progress: MODERATE COMPLEXITY   TREATMENT:   (In addition to Assessment/Re-Assessment sessions the following treatments were rendered)    Pre-treatment Symptoms/Complaints:  Pt is managing B LE lymphedema with CircAid wraps and is in need of new compression. Measured and ordered Ana Amadou wraps today. Nurse  is going to  wraps from [facility] when they arrive. She was instructed in donning and doffing and will teach caregivers. Therapy open for another visit for caregiver training if necessary. Pain: Initial:   Pain Intensity 1: 0  Post Session:  0   Occupational Therapy Treatments:    OT eval( X ) OT eval was completed. Pt received information on lymphedema and risk reduction/self management practices as outlined by the National Lymphedema Network. Manual Therapy:  15 minutes          Compression:  Measured and placed order for bilateral x-small regular length Elex Sofía. Nurse  at appointment, and instructed in donning and doffing garments. She feels able to teach in-home caregivers how to don and doff wraps. Have left therapy open in case another visit is indicated for caregiver instruction with compression management; otherwise, goals attained. Treatment/Session Assessment:    · Response to Treatment: Tolerated without complaint.   Needs met with one visit pending caregiver compliance with compression management in the home.  · Compliance with Program/Exercises: Historically has demonstrated good compliance as evidenced by managed lymphedema in B LEs. · Recommendations/Intent for next treatment session:  \"Next visit will focus on caregiver education as necessary\"  Total Treatment Duration:  45 minutes  OT Patient Time In/Time Out  Time In: 1100  Time Out: [address], OT

## 2018-04-04 ENCOUNTER — HOSPITAL ENCOUNTER (OUTPATIENT)
Dept: ULTRASOUND IMAGING | Age: 83
Discharge: HOME | End: 2018-04-04
Attending: FAMILY MEDICINE
Payer: MEDICARE

## 2018-04-04 DIAGNOSIS — N28.1 RENAL CYST: ICD-10-CM

## 2018-04-04 PROCEDURE — 76770 US EXAM ABDO BACK WALL COMP: CPT

## 2018-04-04 NOTE — PROGRESS NOTES
Lesion previously seen is not present on ultrasound. They suggest coming back in July to redo the ct scan and see if it is still present on ct scan.

## 2018-04-13 PROBLEM — K43.9 VENTRAL HERNIA WITHOUT OBSTRUCTION OR GANGRENE: Status: ACTIVE | Noted: 2018-04-13

## 2018-04-20 ENCOUNTER — HOSPITAL ENCOUNTER (OUTPATIENT)
Dept: GENERAL RADIOLOGY | Age: 83
Discharge: HOME | End: 2018-04-20
Attending: FAMILY MEDICINE
Payer: MEDICARE

## 2018-04-20 DIAGNOSIS — R05.9 COUGH: ICD-10-CM

## 2018-04-20 PROCEDURE — 71046 X-RAY EXAM CHEST 2 VIEWS: CPT

## 2018-04-25 PROBLEM — N83.209 CYST OF OVARY: Status: ACTIVE | Noted: 2018-04-25

## 2018-05-18 NOTE — THERAPY EVALUATION
Tanisha Albert  : 1934  [place]  [place] 1960 [address] Therapy  [address], [facility], [address]  [phone]   [phone]         OUTPATIENT OCCUPATIONAL THERAPY: Discontinuation Summary 2018    ICD-10: Treatment Diagnosis: I 89.0, lymphedema not elsewhere specified; R26.2, difficulty walking; R53.81, debility, unspecified  Precautions/Allergies:   Gold au 198 and Sulfa (sulfonamide antibiotics)   Fall Risk Score: 3 (? 5 = High Risk)  MD Orders: eval and treat MEDICAL/REFERRING DIAGNOSIS:   Localized edema [R60.0]   DATE OF ONSET: Over 5 years   REFERRING PHYSICIAN: Aretha Valverde MD  RETURN PHYSICIAN APPOINTMENT: to be determined      DISCONTINUATION SUMMARY:  Pt did not return for treatment following evaluation. Upon receipt, home nurse coordinator was to fit patient with [employer] [facility] and call if there were any problems or questions. Therapy was left open for another visit for caregiver training if necessary. Have not heard from patient or ; therefore,  will discontinue services. INITIAL ASSESSMENT:  Ms. Faisal Bella presents with bilateral lower extremity lymphedema, managed efficiently at home with compression wraps. Pt is in need of new wraps and is here today for fitting. Legs with 2+/5 pitting edema in calves and feet, and 1+/5 in ankles. She is wearing CircAid garments at home but arrived without any compression. Pt is here with her home health nurse coordinator. PLAN OF CARE:   PROBLEM LIST:  1. Edema/Girth  2. Compression garment fitting INTERVENTIONS PLANNED  1. Skin care  2. Compression bandaging  3. Fitting for compression garment(s)  4. Patient Education     TREATMENT PLAN:  Effective Dates: 3/7/18 TO 18. Frequency/Duration: one to two visits in 30 days and upon reassessment. Will adjust frequency and duration as progress indicates. GOALS: (Goals have been discussed and agreed upon with patient.)  Short-Term Functional Goals: Time Frame: 15 days  1. Pt will be measured/fitted for compression garments for long term management of bilateral lower extremity lymphedema. Goal met  Discharge Goals: Time Frame:30 days   1. The patient/caregiver will be independent with home management of lymphedema. Goal met  3. Patient/caregiver will be independent donning and doffing bilateral lower extremity compression garments.  Goal met      Thank you for this referral,  Marilynn Kehr, OTR/L, CLT

## 2018-05-18 NOTE — THERAPY DISCHARGE
Chi Elliott  : 1934  [place]  [place]: [place] 1960 [address] Therapy  [address], [facility], [address]  [phone]   [phone]         OUTPATIENT OCCUPATIONAL THERAPY: Discontinuation Summary 2018    ICD-10: Treatment Diagnosis: I 89.0, lymphedema not elsewhere specified; R26.2, difficulty walking; R53.81, debility, unspecified  Precautions/Allergies:   Gold au 198 and Sulfa (sulfonamide antibiotics)   Fall Risk Score: 3 (? 5 = High Risk)  MD Orders: eval and treat MEDICAL/REFERRING DIAGNOSIS:   Localized edema [R60.0]   DATE OF ONSET: Over 5 years   REFERRING PHYSICIAN: Marcelo Crowder MD  RETURN PHYSICIAN APPOINTMENT: to be determined      DISCONTINUATION SUMMARY:  Pt did not return for treatment following evaluation. Upon receipt, home nurse coordinator was to fit patient with [facility] and call if there were any problems or questions. Therapy was left open for another visit for caregiver training if necessary. Have not heard from patient or ; therefore,  will discontinue services. INITIAL ASSESSMENT:  Ms. Chayo Lozano presents with bilateral lower extremity lymphedema, managed efficiently at home with compression wraps. Pt is in need of new wraps and is here today for fitting. Legs with 2+/5 pitting edema in calves and feet, and 1+/5 in ankles. She is wearing CircAid garments at home but arrived without any compression. Pt is here with her home health nurse coordinator. PLAN OF CARE:   PROBLEM LIST:  1. Edema/Girth  2. Compression garment fitting INTERVENTIONS PLANNED  1. Skin care  2. Compression bandaging  3. Fitting for compression garment(s)  4. Patient Education     TREATMENT PLAN:  Effective Dates: 3/7/18 TO 18. Frequency/Duration: one to two visits in 30 days and upon reassessment. Will adjust frequency and duration as progress indicates. GOALS: (Goals have been discussed and agreed upon with patient.)  Short-Term Functional Goals: Time Frame: 15 days  1. Pt will be measured/fitted for compression garments for long term management of bilateral lower extremity lymphedema. Goal met  Discharge Goals: Time Frame:30 days   1. The patient/caregiver will be independent with home management of lymphedema. Goal met  3. Patient/caregiver will be independent donning and doffing bilateral lower extremity compression garments.  Goal met      Thank you for this referral,  Brenda Obando, OTR/L, CLT

## 2018-05-21 ENCOUNTER — HOSPITAL ENCOUNTER (EMERGENCY)
Age: 83
Discharge: HOME | End: 2018-05-21
Attending: EMERGENCY MEDICINE
Payer: MEDICARE

## 2018-05-21 ENCOUNTER — APPOINTMENT (OUTPATIENT)
Dept: GENERAL RADIOLOGY | Age: 83
End: 2018-05-21
Attending: EMERGENCY MEDICINE
Payer: MEDICARE

## 2018-05-21 ENCOUNTER — APPOINTMENT (OUTPATIENT)
Dept: CT IMAGING | Age: 83
End: 2018-05-21
Attending: EMERGENCY MEDICINE
Payer: MEDICARE

## 2018-05-21 VITALS
DIASTOLIC BLOOD PRESSURE: 76 MMHG | TEMPERATURE: 98.1 F | OXYGEN SATURATION: 99 % | SYSTOLIC BLOOD PRESSURE: 183 MMHG | RESPIRATION RATE: 20 BRPM | HEART RATE: 55 BPM

## 2018-05-21 DIAGNOSIS — S52.501A CLOSED FRACTURE OF DISTAL END OF RIGHT RADIUS, UNSPECIFIED FRACTURE MORPHOLOGY, INITIAL ENCOUNTER: Primary | ICD-10-CM

## 2018-05-21 DIAGNOSIS — S00.83XA CONTUSION OF FOREHEAD, INITIAL ENCOUNTER: ICD-10-CM

## 2018-05-21 PROCEDURE — A4565 SLINGS: HCPCS

## 2018-05-21 PROCEDURE — 75810000053 HC SPLINT APPLICATION: Performed by: EMERGENCY MEDICINE

## 2018-05-21 PROCEDURE — 70450 CT HEAD/BRAIN W/O DYE: CPT

## 2018-05-21 PROCEDURE — 73110 X-RAY EXAM OF WRIST: CPT

## 2018-05-21 PROCEDURE — 74011250637 HC RX REV CODE- 250/637: Performed by: EMERGENCY MEDICINE

## 2018-05-21 PROCEDURE — 71046 X-RAY EXAM CHEST 2 VIEWS: CPT

## 2018-05-21 PROCEDURE — 99284 EMERGENCY DEPT VISIT MOD MDM: CPT | Performed by: EMERGENCY MEDICINE

## 2018-05-21 RX ORDER — ACETAMINOPHEN 325 MG/1
650 TABLET ORAL
Status: COMPLETED | OUTPATIENT
Start: 2018-05-21 | End: 2018-05-21

## 2018-05-21 RX ORDER — DICLOFENAC SODIUM 50 MG/1
50 TABLET, DELAYED RELEASE ORAL 2 TIMES DAILY
Qty: 14 TAB | Refills: 0 | Status: SHIPPED | OUTPATIENT
Start: 2018-05-21

## 2018-05-21 RX ADMIN — ACETAMINOPHEN 650 MG: 325 TABLET ORAL at 13:40

## 2018-05-21 NOTE — DISCHARGE INSTRUCTIONS
Keep the splint clean and dry. Please call the orthopedist office later this afternoon to arrange a follow-up appointment. Please return to the emergency department with any vomiting, confusion, worsening symptoms, or additional concerns.

## 2018-05-21 NOTE — ED PROVIDER NOTES
HPI Comments: 24-year-old lady presents with concerns about fall that happened in the bathroom this morning. Patient says that prior to the fall she was not having any chest pain, difficulty breathing, chest tightness, heaviness, or pressure. She denied any dizziness and said that she was having no arm or leg weakness or speech problems. Since the fall the patient says that she does have some pain in her right wrist and a mild headache primarily over the right side of her head but no other symptoms. Patient says she is not sure if she slipped in the bathroom or if she potentially passed out while on the toilet. Family notes that the patient did follow up with her cardiologist (Dr. Whit Saunders) not long ago and had a normal cardiac evaluation. Elements of this note were created using speech recognition software. As such, errors of speech recognition may be present. Patient is a 80 y.o. female presenting with fall, wrist pain, and head injury. Fall   Associated symptoms include headaches. Pertinent negatives include no fever, no abdominal pain, no nausea, no vomiting and no hematuria. Wrist Pain      Head Injury    Pertinent negatives include no vomiting and no weakness.         Past Medical History:   Diagnosis Date    CHF (congestive heart failure) (HCC)     Coronary atherosclerosis of unspecified type of vessel, native or graft 8/19/2015    Esophageal reflux 8/19/2015    Gastrointestinal disorder     Gout 8/19/2015    Hypertension     Other and unspecified hyperlipidemia 8/19/2015    Other ill-defined conditions(799.89)     palpitations    Pure hypercholesterolemia 8/19/2015       Past Surgical History:   Procedure Laterality Date    HX CHOLECYSTECTOMY      HX HEENT           Family History:   Problem Relation Age of Onset    Heart Disease Mother     Arthritis-osteo Father     Breast Cancer Sister      Dx in 68's    Colon Cancer Neg Hx     Ovarian Cancer Neg Hx        Social History Social History    Marital status:      Spouse name: N/A    Number of children: N/A    Years of education: N/A     Occupational History    Not on file. Social History Main Topics    Smoking status: Former Smoker    Smokeless tobacco: Never Used      Comment: 30 year history stopped 30 years ago.  Alcohol use No    Drug use: No    Sexual activity: Not on file     Other Topics Concern    Not on file     Social History Narrative         ALLERGIES: Gold au 198 and Sulfa (sulfonamide antibiotics)    Review of Systems   Constitutional: Negative for chills, diaphoresis and fever. HENT: Negative for congestion, rhinorrhea and sore throat. Eyes: Negative for redness and visual disturbance. Respiratory: Negative for cough, chest tightness, shortness of breath and wheezing. Cardiovascular: Negative for chest pain and palpitations. Gastrointestinal: Negative for abdominal pain, blood in stool, diarrhea, nausea and vomiting. Endocrine: Negative for polydipsia and polyuria. Genitourinary: Negative for dysuria and hematuria. Musculoskeletal: Positive for arthralgias. Negative for myalgias and neck stiffness. Skin: Positive for wound. Negative for rash. Allergic/Immunologic: Negative for environmental allergies and food allergies. Neurological: Positive for headaches. Negative for dizziness and weakness. Hematological: Negative for adenopathy. Does not bruise/bleed easily. Psychiatric/Behavioral: Negative for confusion and sleep disturbance. The patient is not nervous/anxious. Vitals:    05/21/18 1044 05/21/18 1058   BP: 162/69    Pulse: (!) 58    Resp: 16    Temp: 98.1 °F (36.7 °C)    SpO2: 93% (!) 89%            Physical Exam   Constitutional: She is oriented to person, place, and time. She appears well-developed and well-nourished. HENT:   Head: Normocephalic and atraumatic. Eyes: Conjunctivae and EOM are normal. Pupils are equal, round, and reactive to light. Neck: Normal range of motion. Cardiovascular: Normal rate and regular rhythm. Pulmonary/Chest: Effort normal and breath sounds normal. No respiratory distress. She has no wheezes. She has no rales. She exhibits no tenderness. Abdominal: Soft. Bowel sounds are normal. There is no rebound and no guarding. Musculoskeletal: Normal range of motion. She exhibits no edema or tenderness. Swelling and tenderness over the dorsal aspect of her right wrist    Contusion over her right forehead   Lymphadenopathy:     She has no cervical adenopathy. Neurological: She is alert and oriented to person, place, and time. Skin: Skin is warm and dry. Psychiatric: She has a normal mood and affect. Nursing note and vitals reviewed. MDM  Number of Diagnoses or Management Options  Diagnosis management comments: A head CT scan and a wrist x-ray for further injury evaluation. ED Course       Gallo Ellis  Date/Time: 5/21/2018 12:52 PM  Performed by: Aimee May  Authorized by: Aimee May     Consent:     Alternatives discussed:  No treatment  Pre-procedure details:     Sensation:  Normal  Procedure details:     Location:  Wrist    Wrist:  R wrist    Strapping: no      Splint type:  Sugar tong  Post-procedure details:     Pain:  Improved    Sensation:  Normal    Patient tolerance of procedure:   Tolerated well, no immediate complications

## 2018-05-21 NOTE — ED TRIAGE NOTES
Pt brought in by EMS after her home health nurse called because pt fell in the bathroom. Pt has abrasion and bruising to right forehead; pain, swelling and bruising to right wrist. Pt states she's unsure what happened this am. Pt alert and oriented on arrival to ER. Blood Glucose - 103 by EMS.

## 2018-05-21 NOTE — ED NOTES
I have reviewed discharge instructions with the patient. The patient verbalized understanding. Patient left ED via Discharge Method: wheelchair to Home with family. Opportunity for questions and clarification provided. Patient given 1 scripts. To continue your aftercare when you leave the hospital, you may receive an automated call from our care team to check in on how you are doing. This is a free service and part of our promise to provide the best care and service to meet your aftercare needs.  If you have questions, or wish to unsubscribe from this service please call [phone]. Thank you for Choosing our [employer] Emergency Department.

## 2018-05-30 ENCOUNTER — APPOINTMENT (OUTPATIENT)
Dept: CT IMAGING | Age: 83
DRG: 688 | End: 2018-05-30
Attending: EMERGENCY MEDICINE
Payer: MEDICARE

## 2018-05-30 ENCOUNTER — HOSPITAL ENCOUNTER (INPATIENT)
Age: 83
LOS: 9 days | Discharge: NURSING, REHAB OR LONG-TERM CARE | DRG: 688 | End: 2018-06-08
Attending: EMERGENCY MEDICINE | Admitting: INTERNAL MEDICINE
Payer: MEDICARE

## 2018-05-30 ENCOUNTER — APPOINTMENT (OUTPATIENT)
Dept: GENERAL RADIOLOGY | Age: 83
DRG: 688 | End: 2018-05-30
Attending: EMERGENCY MEDICINE
Payer: MEDICARE

## 2018-05-30 DIAGNOSIS — D64.9 ANEMIA, UNSPECIFIED TYPE: Primary | ICD-10-CM

## 2018-05-30 DIAGNOSIS — R19.00 PELVIC MASS: ICD-10-CM

## 2018-05-30 DIAGNOSIS — R53.83 FATIGUE, UNSPECIFIED TYPE: ICD-10-CM

## 2018-05-30 DIAGNOSIS — N83.209 OVARIAN CYST: ICD-10-CM
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
## 2018-05-30 PROBLEM — D49.59 OVARIAN TUMOR: Status: ACTIVE | Noted: 2018-05-30
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
## 2018-05-30 LAB
ALBUMIN SERPL-MCNC: 1.8 G/DL (ref 3.2–4.6)
ALBUMIN/GLOB SERPL: 0.4 {RATIO} (ref 1.2–3.5)
ALP SERPL-CCNC: 68 U/L (ref 50–136)
ALT SERPL-CCNC: 14 U/L (ref 12–65)
ANION GAP SERPL CALC-SCNC: 13 MMOL/L (ref 7–16)
AST SERPL-CCNC: 45 U/L (ref 15–37)
BASOPHILS # BLD: 0.1 K/UL (ref 0–0.2)
BASOPHILS NFR BLD MANUAL: 1 % (ref 0–2)
BILIRUB SERPL-MCNC: 0.6 MG/DL (ref 0.2–1.1)
BUN SERPL-MCNC: 30 MG/DL (ref 8–23)
CALCIUM SERPL-MCNC: 8.9 MG/DL (ref 8.3–10.4)
CHLORIDE SERPL-SCNC: 104 MMOL/L (ref 98–107)
CO2 SERPL-SCNC: 26 MMOL/L (ref 21–32)
CREAT SERPL-MCNC: 1.37 MG/DL (ref 0.6–1)
DIFFERENTIAL METHOD BLD: ABNORMAL
ERYTHROCYTE [DISTWIDTH] IN BLOOD BY AUTOMATED COUNT: 19.1 % (ref 11.9–14.6)
GLOBULIN SER CALC-MCNC: 5 G/DL (ref 2.3–3.5)
GLUCOSE SERPL-MCNC: 129 MG/DL (ref 65–100)
HCT VFR BLD AUTO: 28.6 % (ref 35.8–46.3)
HGB BLD-MCNC: 8.9 G/DL (ref 11.7–15.4)
LACTATE BLD-SCNC: 1.8 MMOL/L (ref 0.5–1.9)
LIPASE SERPL-CCNC: 63 U/L (ref 73–393)
LYMPHOCYTES # BLD: 1.9 K/UL (ref 0.5–4.6)
LYMPHOCYTES NFR BLD MANUAL: 16 % (ref 16–44)
MAGNESIUM SERPL-MCNC: 1.9 MG/DL (ref 1.8–2.4)
MCH RBC QN AUTO: 24.9 PG (ref 26.1–32.9)
MCHC RBC AUTO-ENTMCNC: 31.1 G/DL (ref 31.4–35)
MCV RBC AUTO: 80.1 FL (ref 79.6–97.8)
MONOCYTES # BLD: 1.3 K/UL (ref 0.1–1.3)
MONOCYTES NFR BLD MANUAL: 11 % (ref 3–9)
NEUTS SEG # BLD: 8.3 K/UL (ref 1.7–8.2)
NEUTS SEG NFR BLD MANUAL: 72 % (ref 47–75)
NRBC BLD-RTO: 2 PER 100 WBC
PLATELET # BLD AUTO: 443 K/UL (ref 150–450)
PLATELET COMMENTS,PCOM: ADEQUATE
PMV BLD AUTO: 9.3 FL (ref 10.8–14.1)
POTASSIUM SERPL-SCNC: 3.5 MMOL/L (ref 3.5–5.1)
PROCALCITONIN SERPL-MCNC: 7.5 NG/ML
PROT SERPL-MCNC: 6.8 G/DL (ref 6.3–8.2)
RBC # BLD AUTO: 3.57 M/UL (ref 4.05–5.25)
RBC MORPH BLD: ABNORMAL
SODIUM SERPL-SCNC: 143 MMOL/L (ref 136–145)
WBC # BLD AUTO: 11.6 K/UL (ref 4.3–11.1)
WBC MORPH BLD: ABNORMAL

## 2018-05-30 PROCEDURE — 83690 ASSAY OF LIPASE: CPT | Performed by: EMERGENCY MEDICINE

## 2018-05-30 PROCEDURE — 84145 PROCALCITONIN (PCT): CPT | Performed by: EMERGENCY MEDICINE

## 2018-05-30 PROCEDURE — 80053 COMPREHEN METABOLIC PANEL: CPT | Performed by: EMERGENCY MEDICINE

## 2018-05-30 PROCEDURE — 99285 EMERGENCY DEPT VISIT HI MDM: CPT | Performed by: EMERGENCY MEDICINE

## 2018-05-30 PROCEDURE — 85025 COMPLETE CBC W/AUTO DIFF WBC: CPT | Performed by: EMERGENCY MEDICINE

## 2018-05-30 PROCEDURE — 94760 N-INVAS EAR/PLS OXIMETRY 1: CPT

## 2018-05-30 PROCEDURE — 77010033678 HC OXYGEN DAILY

## 2018-05-30 PROCEDURE — 71046 X-RAY EXAM CHEST 2 VIEWS: CPT

## 2018-05-30 PROCEDURE — 83605 ASSAY OF LACTIC ACID: CPT

## 2018-05-30 PROCEDURE — 65270000029 HC RM PRIVATE

## 2018-05-30 PROCEDURE — 70450 CT HEAD/BRAIN W/O DYE: CPT

## 2018-05-30 PROCEDURE — 83735 ASSAY OF MAGNESIUM: CPT | Performed by: EMERGENCY MEDICINE

## 2018-05-30 PROCEDURE — 74011250637 HC RX REV CODE- 250/637: Performed by: INTERNAL MEDICINE

## 2018-05-30 RX ORDER — CLONAZEPAM 0.5 MG/1
0.5 TABLET ORAL 2 TIMES DAILY
Status: DISCONTINUED | OUTPATIENT
Start: 2018-05-30 | End: 2018-06-05 | Stop reason: HOSPADM

## 2018-05-30 RX ORDER — PANTOPRAZOLE SODIUM 40 MG/1
40 TABLET, DELAYED RELEASE ORAL
Status: DISCONTINUED | OUTPATIENT
Start: 2018-05-31 | End: 2018-06-05 | Stop reason: HOSPADM

## 2018-05-30 RX ORDER — SODIUM CHLORIDE 0.9 % (FLUSH) 0.9 %
5-10 SYRINGE (ML) INJECTION EVERY 8 HOURS
Status: DISCONTINUED | OUTPATIENT
Start: 2018-05-30 | End: 2018-06-05 | Stop reason: HOSPADM

## 2018-05-30 RX ORDER — DIPHENHYDRAMINE HYDROCHLORIDE 50 MG/ML
12.5 INJECTION, SOLUTION INTRAMUSCULAR; INTRAVENOUS
Status: DISCONTINUED | OUTPATIENT
Start: 2018-05-30 | End: 2018-06-05 | Stop reason: HOSPADM

## 2018-05-30 RX ORDER — LISINOPRIL 20 MG/1
40 TABLET ORAL DAILY
Status: DISCONTINUED | OUTPATIENT
Start: 2018-05-31 | End: 2018-06-05 | Stop reason: HOSPADM

## 2018-05-30 RX ORDER — ONDANSETRON 8 MG/1
8 TABLET, ORALLY DISINTEGRATING ORAL
COMMUNITY

## 2018-05-30 RX ORDER — ALLOPURINOL 100 MG/1
100 TABLET ORAL 2 TIMES DAILY
Status: DISCONTINUED | OUTPATIENT
Start: 2018-05-30 | End: 2018-06-05 | Stop reason: HOSPADM

## 2018-05-30 RX ORDER — SODIUM CHLORIDE 0.9 % (FLUSH) 0.9 %
5-10 SYRINGE (ML) INJECTION AS NEEDED
Status: DISCONTINUED | OUTPATIENT
Start: 2018-05-30 | End: 2018-06-05 | Stop reason: HOSPADM

## 2018-05-30 RX ORDER — DONEPEZIL HYDROCHLORIDE 5 MG/1
10 TABLET, FILM COATED ORAL
Status: DISCONTINUED | OUTPATIENT
Start: 2018-05-30 | End: 2018-06-05 | Stop reason: HOSPADM

## 2018-05-30 RX ORDER — BUPROPION HYDROCHLORIDE 150 MG/1
150 TABLET, EXTENDED RELEASE ORAL 2 TIMES DAILY
Status: DISCONTINUED | OUTPATIENT
Start: 2018-05-30 | End: 2018-05-30 | Stop reason: SDUPTHER

## 2018-05-30 RX ORDER — LORATADINE 10 MG/1
10 TABLET ORAL DAILY
Status: DISCONTINUED | OUTPATIENT
Start: 2018-05-31 | End: 2018-06-05 | Stop reason: HOSPADM

## 2018-05-30 RX ORDER — ONDANSETRON 2 MG/ML
4 INJECTION INTRAMUSCULAR; INTRAVENOUS
Status: DISCONTINUED | OUTPATIENT
Start: 2018-05-30 | End: 2018-06-05 | Stop reason: HOSPADM

## 2018-05-30 RX ORDER — BUPROPION HYDROCHLORIDE 150 MG/1
150 TABLET, EXTENDED RELEASE ORAL DAILY
Status: DISCONTINUED | OUTPATIENT
Start: 2018-05-31 | End: 2018-06-05 | Stop reason: HOSPADM

## 2018-05-30 RX ORDER — METOPROLOL SUCCINATE 50 MG/1
50 TABLET, EXTENDED RELEASE ORAL DAILY
Status: DISCONTINUED | OUTPATIENT
Start: 2018-05-31 | End: 2018-06-05 | Stop reason: HOSPADM

## 2018-05-30 RX ORDER — ACETAMINOPHEN 325 MG/1
650 TABLET ORAL
Status: DISCONTINUED | OUTPATIENT
Start: 2018-05-30 | End: 2018-06-05 | Stop reason: HOSPADM

## 2018-05-30 RX ORDER — AMOXICILLIN 250 MG
1 CAPSULE ORAL DAILY
Status: DISCONTINUED | OUTPATIENT
Start: 2018-05-31 | End: 2018-06-05 | Stop reason: HOSPADM

## 2018-05-30 RX ORDER — CITALOPRAM 20 MG/1
20 TABLET, FILM COATED ORAL DAILY
Status: DISCONTINUED | OUTPATIENT
Start: 2018-05-31 | End: 2018-06-05 | Stop reason: HOSPADM

## 2018-05-30 RX ORDER — FLUTICASONE PROPIONATE 50 MCG
1 SPRAY, SUSPENSION (ML) NASAL DAILY
Status: DISCONTINUED | OUTPATIENT
Start: 2018-05-31 | End: 2018-06-05 | Stop reason: HOSPADM

## 2018-05-30 RX ORDER — FUROSEMIDE 40 MG/1
40 TABLET ORAL
Status: DISCONTINUED | OUTPATIENT
Start: 2018-05-30 | End: 2018-06-05 | Stop reason: HOSPADM

## 2018-05-30 RX ORDER — GUAIFENESIN 100 MG/5ML
81 LIQUID (ML) ORAL DAILY
Status: DISCONTINUED | OUTPATIENT
Start: 2018-05-31 | End: 2018-06-05 | Stop reason: HOSPADM

## 2018-05-30 RX ORDER — HYDROCODONE BITARTRATE AND ACETAMINOPHEN 5; 325 MG/1; MG/1
1 TABLET ORAL
Status: DISCONTINUED | OUTPATIENT
Start: 2018-05-30 | End: 2018-06-05 | Stop reason: HOSPADM

## 2018-05-30 RX ADMIN — CLONAZEPAM 0.5 MG: 0.5 TABLET ORAL at 17:34

## 2018-05-30 RX ADMIN — ACETAMINOPHEN 650 MG: 325 TABLET ORAL at 17:34

## 2018-05-30 RX ADMIN — ALLOPURINOL 100 MG: 100 TABLET ORAL at 17:34

## 2018-05-30 RX ADMIN — DONEPEZIL HYDROCHLORIDE 10 MG: 5 TABLET, FILM COATED ORAL at 22:13

## 2018-05-30 RX ADMIN — FUROSEMIDE 40 MG: 40 TABLET ORAL at 16:28

## 2018-05-30 RX ADMIN — Medication 5 ML: at 22:13

## 2018-05-30 NOTE — PROGRESS NOTES
Son Brit Ibanez did not show up at hospital for scheduled conference at [facility] Md, and Aylin Cortez from [facility] met outside pt's room to discuss family/pt goals of care. Daughter wanted to speak with Dr. Chikis Conde about tumor/findings and plan going forward. Daughter was quite distracted and looked at her phone continually. All attempted to discuss comfort care with a trial of rehab in order to gain strength. Daughter stated pt would not go to [facility], but would go home with additional caregivers and Hospice when the time was right. Pt wanting to leave hospital and trying with PT. Pt extremely weak and requires assistance with sitting balance on edge of bed. Jerica planned to update Brit Ibanez on all the discussion. Also Md informed T that hospital does not have the legal documents in pt's chart for [address] [place] [address] which T stated that she and her brother share responsibility. Keily called Carlos Alberto Desai who is having the documents faxed to hospital this pm. Anticipate pt transferring to [facility] tomorrow. Keily told Jerica that either she or her brother had to do the admission paperwork in order for her to be able to go. Will follow up in am. Phylicia Michaud received a call from Carlos Alberto Desai. Long talk with her about family dynamics. Jerica lives in [place], but is closing on a house here in [place] today. She plans to contact Jerica and Brit Ibanez today and attempt to coordinate a family conference with their mom in the hospital with Md to discuss goals of care. Sitters are not to make any decisions or communicate medical info to family. Catrachito GARDNER and Kali Murphy are hired by the family to be the [employer]  between all and family. Keily will cont to follow and assist. Phylicia Michaud continuing to follow. Keily talked with Md at length this am and then talked with pt and a sitter. Pt was seen by Ortho who ordered an X-ray. Pt is still on 02 at 1 liter and is alert and talked with Keily.  Sitter stated PT worked with pt (sitting on edge of bed yesterday). Md has spoken with pt's daughter Leeann Chen who lives out of state on the phone. Md has been unable to talk with Gia Villafana pt's son and POA. Md is not sure how much work up has been done on the ovarian mass and how much pt has been involved with all her medical decisions. Son has not been avail in pt's hospital room to talk.  placed call to Latrell Garcia MSW with [address] in an effort to coordinate a family meeting. Md is approaching discharge on this pt, but feels the Goals of Care are not clear. Await call back and decision about family conference. Sw will cont to follow and assist. Pawan Gibson/MARLY        Pt just admitted moments ago from ER. Aylin Bailey) with [facility]) came to talk with hospital . Pt is an 80 yr old female who fell in the bathroom about 2 weeks ago and broke her arm. She has been splinted since awaiting casting. Pt is  and she and her  are both demented. They have a son Gia Villafana who lives locally and  runs the family business and the daughter lives out of state in [place]. Bridget Winn sends a  Latrell Garcia and a nurse Dana. Couple has 24/7 caregivers with [address] care as well as [place] caregivers who run errands/shopping etc.  has a . 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 Family has decided on no surgery for this.  is more demented than pt but rec radiation for his nose. Pt is extremely weak and unable to walk, her Hgb is low, and she is more confused. Unsure at this time what the discharge plan will be. Son will likely want pt to return home. Could discuss Hospice (in home or [facility]) if this is indicated. Will have to await medical workup to determine best discharge plan. Sw to coordinate with community (hired/private pay) Geriatric Social Worker Catrachito as well as pt's son Gia Villafana.    Matteo Elmore

## 2018-05-30 NOTE — H&P
History and Physical    Patient: Monae Mistry MRN: [id number]  SSN: [id number]    YOB: 1934  Age: 80 y.o. Sex: female      Subjective: Monae Mistry is a 80 y.o. female who was brought to ER by her caretaker for fatigue. 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 No surgery or definite treatment was planned. She also had recent fall at home and had right arm fracture, currently on splint. No surgery was planned due to her overall poor health condition. Patient was feeling weak for the past few days. No energy. She denies fever. No chills. No shortness of breath. No chest pain. Patient reports a lot of bruises all over her body and at the right half of her face due to recent fall. No headache now. In ER, patient was found to have anemia. Her Hb dropped to 8+ from baseline of 12+ recently. Due to multiple medical problems and possibility of acute blood loss anemia, hospitalist service was asked to see patient. Past Medical History:   Diagnosis Date    CHF (congestive heart failure) (HCC)     Coronary atherosclerosis of unspecified type of vessel, native or graft 8/19/2015    Esophageal reflux 8/19/2015    Gastrointestinal disorder     Gout 8/19/2015    Hypertension     Other and unspecified hyperlipidemia 8/19/2015    Other ill-defined conditions(799.89)     palpitations    Pure hypercholesterolemia 8/19/2015     Past Surgical History:   Procedure Laterality Date    HX CHOLECYSTECTOMY      HX HEENT        Family History   Problem Relation Age of Onset    Heart Disease Mother     Arthritis-osteo Father     Breast Cancer Sister      Dx in 68's    Colon Cancer Neg Hx     Ovarian Cancer Neg Hx      Social History   Substance Use Topics    Smoking status: Former Smoker    Smokeless tobacco: Never Used      Comment: 30 year history stopped 30 years ago.      Alcohol use No Prior to Admission medications    Medication Sig Start Date End Date Taking? Authorizing Provider   lactulose (CHRONULAC) 10 gram/15 mL solution Take 15 mL by mouth three (3) times daily. 5/29/18   Patricia Curiel MD   diclofenac EC (VOLTAREN) 50 mg EC tablet Take 1 Tab by mouth two (2) times a day. 5/21/18   Alireza Waldron MD   fexofenadine (ALLEGRA) 180 mg tablet Take 180 mg by mouth daily as needed for Allergies. Historical Provider   diphenoxylate-atropine (LOMOTIL) 2.5-0.025 mg per tablet Take 1 Tab by mouth four (4) times daily as needed for Diarrhea. Max Daily Amount: 4 Tabs. 3/23/18   Patricia Curiel MD   donepezil (ARICEPT) 10 mg tablet Take 1 Tab by mouth nightly. 2/13/18   Patricia Curiel MD   calcium citrate-vitamin D3 (CITRACAL + D) tablet Take 1 Tab by mouth daily as needed. Historical Provider   aspirin 81 mg chewable tablet Take 81 mg by mouth daily. Historical Provider   acetaminophen (TYLENOL) 325 mg tablet Take 325 mg by mouth every six (6) hours as needed for Pain. Historical Provider   D-METHORPHAN/PE/ACETAMINOPHEN (JUDI-SELTZER PLUS DAY PO) Take  by mouth every four (4) hours as needed. Historical Provider   ketotifen (ZADITOR) 0.025 % (0.035 %) ophthalmic solution Administer 1 Drop to both eyes daily as needed. Historical Provider   nystatin (MYCOSTATIN) powder Apply  to affected area three (3) times daily. 1/22/18   Shaquille Marino MD   lisinopril (PRINIVIL, ZESTRIL) 40 mg tablet Take 1 Tab by mouth daily. 12/15/17   Patricia Curiel MD   omeprazole (PRILOSEC) 40 mg capsule Take 1 Cap by mouth daily. 12/15/17   Patricia Curiel MD   citalopram (CELEXA) 20 mg tablet Take 1 Tab by mouth daily. 12/15/17   Patricia Curiel MD   potassium chloride SA (MICRO-K) 10 mEq capsule TAKE ONE CAPSULE BY MOUTH EVERY DAY FOR 90 DAYS 12/15/17   Patricia Curiel MD   metoprolol succinate (TOPROL-XL) 50 mg XL tablet Take 1 Tab by mouth daily.  12/15/17   Janelle Cobian Evelia Gilman MD   buPROPion XL (WELLBUTRIN XL) 150 mg tablet Take 1 Tab by mouth every morning. 12/15/17   Jutsine Hill MD   ibandronate (BONIVA) 150 mg tablet Take 150 mg by mouth every thirty (30) days. 12/15/17   Justine Hill MD   clonazePAM (KLONOPIN) 0.5 mg tablet Take 1 Tab by mouth two (2) times a day. Max Daily Amount: 1 mg. 12/15/17   Justine Hill MD   allopurinol (ZYLOPRIM) 100 mg tablet Take 1 Tab by mouth two (2) times a day. 10/25/17   Justine Hill MD   furosemide (LASIX) 80 mg tablet Take 1 Tab by mouth daily. 10/25/17   Justine Hill MD   fluticasone Justus Repress) 50 mcg/actuation nasal spray Take 1 spray in each nostril daily 10/25/17   Justine Hill MD   Lucy Ezio (ULTRA-LIGHT ROLLATOR) misc by Does Not Apply route. Sig: Use as directed 9/4/13   Historical Provider        Allergies   Allergen Reactions    Gold Au 198 Unknown (comments)    Sulfa (Sulfonamide Antibiotics) Unknown (comments)       Review of Systems:    Constitutional: Negative for chills and fever. HENT: Negative for rhinorrhea and sore throat. Eyes: Negative for pain, redness and visual disturbance. Respiratory: Negative for chest tightness, shortness of breath and wheezing. Cardiovascular: Negative for chest pain and positive for leg swelling and weeping skin  Gastrointestinal: Negative for abdominal pain, bowel incontinence, diarrhea, nausea and vomiting. Genitourinary: sometimes with bladder incontinence, no dysuria and hematuria. Musculoskeletal: Negative for back pain, gait problem, neck pain and neck stiffness. Skin: multiple bruises on extremities and right half of the face. Neurological: negative for speech difficulty. Negative for focal weakness, weakness, numbness, headaches and loss of balance. Psychiatric/Behavioral: Negative for agitation, confusion and memory loss.          Objective:     Vitals:    05/30/18 1106 05/30/18 1240 05/30/18 1250 05/30/18 1300   BP: 179/76 190/79 200/83 173/73   Pulse: 79 74  80   Resp: 16      Temp: 99.4 °F (37.4 °C)      SpO2: 94% 93%  94%   Weight: 93.9 kg (207 lb)      Height: 5' 4\" (1.626 m)           Physical Exam:    General:                    The patient is an elderly female in no acute respiratory distress. Appears chronically ill. Head:                                   Normocephalic. Bruises surrounding right eye and cheek. Eyes:                                   + palpebral pallor, no scleral icterus. ENT:                                    External auricular and nasal exam within normal limits. Mucous membranes are moist.  Neck:                                   Supple, non-tender, no JVD. Lungs:                       Clear to auscultation bilaterally without wheezes or crackles. No respiratory distress or accessory muscle use. Heart:                                  Regular rate and rhythm, without murmurs, rubs, or gallops. Abdomen:                  Soft, non-tender, very distended with normoactive bowel sounds. Positive for ventral hernia with palpation of a mass underneath it. Genitourinary:           No tenderness over the bladder or bilateral CVAs. Extremities:               Without clubbing, cyanosis. Leg edema 1+. Some skin changes. Weeping lesion on the right shin. Right arm in a sling. Skin:                                    Normal color, texture, and turgor. Multiple bruises. Pulses:                      Radial and dorsalis pedis pulses present 2+ bilaterally. Capillary refill <2s. Neurologic:                CN II-XII grossly intact and symmetrical.                                               Moving all four extremities well with no focal deficits. Psychiatric:                Pleasant demeanor, appropriate affect.  Alert and oriented x 3    Lab and data    Recent Results (from the past 24 hour(s))   CBC WITH AUTOMATED DIFF    Collection Time: 05/30/18 12:04 PM   Result Value Ref Range    WBC 11.6 (H) 4.3 - 11.1 K/uL    RBC 3.57 (L) 4.05 - 5.25 M/uL    HGB 8.9 (L) 11.7 - 15.4 g/dL    HCT 28.6 (L) 35.8 - 46.3 %    MCV 80.1 79.6 - 97.8 FL    MCH 24.9 (L) 26.1 - 32.9 PG    MCHC 31.1 (L) 31.4 - 35.0 g/dL    RDW 19.1 (H) 11.9 - 14.6 %    PLATELET 241 847 - 576 K/uL    MPV 9.3 (L) 10.8 - 14.1 FL    NEUTROPHILS 72 47 - 75 %    LYMPHOCYTES 16 16 - 44 %    MONOCYTES 11 (H) 3 - 9 %    BASOPHILS 1 0 - 2 %    NRBC 2.0  WBC    ABS. NEUTROPHILS 8.3 (H) 1.7 - 8.2 K/UL    ABS. LYMPHOCYTES 1.9 0.5 - 4.6 K/UL    ABS. MONOCYTES 1.3 0.1 - 1.3 K/UL    ABS. BASOPHILS 0.1 0.0 - 0.2 K/UL    RBC COMMENTS NORMOCYTIC/NORMOCHROMIC      WBC COMMENTS RARE      PLATELET COMMENTS ADEQUATE      DF MANUAL     METABOLIC PANEL, COMPREHENSIVE    Collection Time: 05/30/18 12:04 PM   Result Value Ref Range    Sodium 143 136 - 145 mmol/L    Potassium 3.5 3.5 - 5.1 mmol/L    Chloride 104 98 - 107 mmol/L    CO2 26 21 - 32 mmol/L    Anion gap 13 7 - 16 mmol/L    Glucose 129 (H) 65 - 100 mg/dL    BUN 30 (H) 8 - 23 MG/DL    Creatinine 1.37 (H) 0.6 - 1.0 MG/DL    GFR est AA 47 (L) >60 ml/min/1.73m2    GFR est non-AA 39 (L) >60 ml/min/1.73m2    Calcium 8.9 8.3 - 10.4 MG/DL    Bilirubin, total 0.6 0.2 - 1.1 MG/DL    ALT (SGPT) 14 12 - 65 U/L    AST (SGOT) 45 (H) 15 - 37 U/L    Alk. phosphatase 68 50 - 136 U/L    Protein, total 6.8 6.3 - 8.2 g/dL    Albumin 1.8 (L) 3.2 - 4.6 g/dL    Globulin 5.0 (H) 2.3 - 3.5 g/dL    A-G Ratio 0.4 (L) 1.2 - 3.5     MAGNESIUM    Collection Time: 05/30/18 12:04 PM   Result Value Ref Range    Magnesium 1.9 1.8 - 2.4 mg/dL   LIPASE    Collection Time: 05/30/18 12:04 PM   Result Value Ref Range    Lipase 63 (L) 73 - 393 U/L   POC LACTIC ACID    Collection Time: 05/30/18 12:06 PM   Result Value Ref Range    Lactic Acid (POC) 1.8 0.5 - 1.9 mmol/L     CT brain  5/30/2018  Impression:     1.  Findings most compatible with moderate chronic small vessel ischemic changes. 2. Otherwise unremarkable unenhanced CT scan of the brain. CXR  5/30/2018  Impression: No active disease in the chest.      I have reviewed chest x-ray myself. Assessment:     Hospital Problems  Date Reviewed: 4/13/2018          Codes Class Noted POA    * (Principal)Fatigue ICD-10-CM: E16.84  ICD-9-CM: 780.79  5/30/2018 Unknown        Anemia ICD-10-CM: D64.9  ICD-9-CM: 285.9  5/30/2018 Unknown        Ovarian tumor ICD-10-CM: D49.59  ICD-9-CM: 239.5  5/30/2018 Unknown        Fall at home, subsequent encounter ICD-10-CM: W19. Teressa Gonzalez, Y92.009  ICD-9-CM: A1323763, S1638120  5/30/2018 Unknown        Ventral hernia without obstruction or gangrene ICD-10-CM: K43.9  ICD-9-CM: 553.20  4/13/2018 Yes        Essential hypertension, benign ICD-10-CM: I10  ICD-9-CM: 401.1  8/19/2015 Yes        Pure hypercholesterolemia ICD-10-CM: E78.00  ICD-9-CM: 272.0  8/19/2015 Yes              Plan:     Fatigue  Still unclear about etiologies. Likely from several factors including anemia (?acute on chronic), volume depletion, ?cancer, occult infections. Admit for work up and monitoring. Blood culture, urine culture. Monitor CBC, BMP, vital signs. Check procalcitonin. Avoid medications that make patient prone to bleeding. Get physical therapy to help. Anemia  Monitor CBC and symptoms. So far, no signs of GI bleeding. Possible blood loss from the fall and hematoma to several areas of her body. May need transfusion, if worsened. Hypertension  Monitor blood pressure and manage accordingly. Continue home medications. Hyperlipidemia  Continue home medications. Ovarian tumor. No definite treatment as per patient's caretaker due to overall poor health condition. Right arm fracture recently  Continue in a sling. Physical therapy. Planned for cast, as per the caretaker. I have discussed with patient regarding advance directive.  Patient would like to have a full-code status. Patient requires hospital stay as an in-patient and anticipated stay is more than 2 midnights due to the serious nature of the illness. I have discussed the plan of care with patient and her caretaker.      DVT prophylaxis : SCD         Signed By: Mila Boykin MD     May 30, 2018

## 2018-05-30 NOTE — ED NOTES
TRANSFER - OUT REPORT:    Verbal report given to [address] on [address]  being transferred to 350 for routine progression of care       Report consisted of patients Situation, Background, Assessment and   Recommendations(SBAR). Information from the following report(s) ED Summary was reviewed with the receiving nurse. Lines:   Peripheral IV 05/30/18 Left Antecubital (Active)   Site Assessment Clean, dry, & intact 5/30/2018 12:06 PM        Opportunity for questions and clarification was provided.       Patient transported with:   [facility]

## 2018-05-30 NOTE — ED NOTES
Patient status is unchanged, resting but easily aroused, family is not at bedside, call button within reach. Patient  did not need to use bathroom. Patient was asked to rate their pain, patient was then repositioned for comfort. Patient has her Aide and Nurse Advisor at the bedside.

## 2018-05-30 NOTE — PROGRESS NOTES
Received pt from ED in stable condition. Family and care giver/home health nurse at bedside. Pt alert and oriented to person and place. Disoriented to time. Breath sounds diminished bilaterally, however no signs of respiratory distress on 2L 02 via NC. HR regular. Bowel sounds hypoactive in all 4 quadrants. Abdomen semi soft, pt has a protruding tumor in her umbilicus. This area is warmer and harder than the surrounding abdomen. Home health nurse states this is not new and the pt is seen by oncology for the tumor. History of falls, right arm is fractured and wrapped. Pt also has significant bruising to her face, right side of her body, and right hip. Sacrum is red, but blanchable. Incontinent, pt in a brief. Bilateral lower extremities are edematous with +3 edema, heels are also red but blanchable. Bilateral SCDs on pt. IV patent and capped. Bed low and locked, call light within reach, family instructed on call lights, call light within reach. Will continue to monitor.

## 2018-05-30 NOTE — ED PROVIDER NOTES
HPI Comments: Patient presents to the ER for worsening mental status and confusion. Patient is status post recent fall which she sustained a right wrist fracture as well as right facial contusion. Family states for the past couple days she's had some difficulty with ambulation. Apparently last night she became somewhat more confused and now is requiring significant amounts of assistance to stand. Family also reports increased lower extremity edema. There is no reported fevers or vomiting. Patient is a 80 y.o. female presenting with altered mental status. Altered mental status    This is a new problem. The current episode started 2 days ago. The problem has not changed since onset. Associated symptoms include confusion and weakness. Pertinent negatives include no somnolence, no agitation, no delusions and no numbness. Her past medical history is significant for dementia and psychotropic medication treatment. Her past medical history does not include TIA. Past Medical History:   Diagnosis Date    CHF (congestive heart failure) (HCC)     Coronary atherosclerosis of unspecified type of vessel, native or graft 8/19/2015    Esophageal reflux 8/19/2015    Gastrointestinal disorder     Gout 8/19/2015    Hypertension     Other and unspecified hyperlipidemia 8/19/2015    Other ill-defined conditions(799.89)     palpitations    Pure hypercholesterolemia 8/19/2015       Past Surgical History:   Procedure Laterality Date    HX CHOLECYSTECTOMY      HX HEENT           Family History:   Problem Relation Age of Onset    Heart Disease Mother     Arthritis-osteo Father     Breast Cancer Sister      Dx in 68's    Colon Cancer Neg Hx     Ovarian Cancer Neg Hx        Social History     Social History    Marital status:      Spouse name: N/A    Number of children: N/A    Years of education: N/A     Occupational History    Not on file.      Social History Main Topics    Smoking status: Former Smoker    Smokeless tobacco: Never Used      Comment: 30 year history stopped 30 years ago.  Alcohol use No    Drug use: No    Sexual activity: Not on file     Other Topics Concern    Not on file     Social History Narrative         ALLERGIES: Gold au 198 and Sulfa (sulfonamide antibiotics)    Review of Systems   Constitutional: Negative for fatigue, fever and unexpected weight change. HENT: Negative for congestion. Eyes: Negative for photophobia and redness. Respiratory: Negative for chest tightness and wheezing. Cardiovascular: Positive for leg swelling. Negative for palpitations. Gastrointestinal: Positive for nausea. Negative for abdominal pain and vomiting. Endocrine: Negative for polydipsia, polyphagia and polyuria. Genitourinary: Negative for flank pain and frequency. Musculoskeletal: Negative for back pain and gait problem. Skin: Negative for pallor and rash. Allergic/Immunologic: Negative for food allergies. Neurological: Positive for weakness. Negative for numbness. Hematological: Negative for adenopathy. Psychiatric/Behavioral: Positive for confusion. Negative for agitation. All other systems reviewed and are negative. Vitals:    05/30/18 1106   BP: 179/76   Pulse: 79   Resp: 16   Temp: 99.4 °F (37.4 °C)   SpO2: 94%   Weight: 93.9 kg (207 lb)   Height: 5' 4\" (1.626 m)            Physical Exam   Constitutional: She is oriented to person, place, and time. She appears well-developed and well-nourished. HENT:   Head: Normocephalic. Head is with contusion. Mouth/Throat: Oropharynx is clear and moist.   Neck: Normal range of motion. Neck supple. No thyromegaly present. Cardiovascular: Normal rate and intact distal pulses. Pulmonary/Chest: Effort normal and breath sounds normal.   Abdominal: Soft. Bowel sounds are normal. She exhibits distension. There is tenderness in the periumbilical area. Genitourinary: Rectal exam shows guaiac negative stool. Musculoskeletal: She exhibits edema. Arms:  Neurological: She is alert and oriented to person, place, and time. She has normal reflexes. Nursing note and vitals reviewed. MDM  Number of Diagnoses or Management Options  Diagnosis management comments: Differential diagnosis fall from a status in this patient is broad. We'll repeat CT scan of head injury or recent fall. Also obtained basic labs and urinalysis. Her umbilical mass noted, this is reportedly abdominal mass related to her ovarian cancer    1:45 PM  Labs only significant for white blood cell count of 11.6. No atypical lymphocytes as well as platelets noted. Hemoglobin is down at 8.9. Rectal exam shows brown stool which is heme-negative. Chemistry panel normal.  Normal active acid. Chest x-ray clear. Urinalysis is negative. Given her continued fatigue and anemia, case discussed with hospitalist for admission. Amount and/or Complexity of Data Reviewed  Clinical lab tests: ordered and reviewed  Tests in the radiology section of CPT®: ordered and reviewed    Risk of Complications, Morbidity, and/or Mortality  Presenting problems: high  Diagnostic procedures: moderate  Management options: moderate    Patient Progress  Patient progress: stable        ED Course       Procedures           Results Include:    Recent Results (from the past 24 hour(s))   CBC WITH AUTOMATED DIFF    Collection Time: 05/30/18 12:04 PM   Result Value Ref Range    WBC 11.6 (H) 4.3 - 11.1 K/uL    RBC 3.57 (L) 4.05 - 5.25 M/uL    HGB 8.9 (L) 11.7 - 15.4 g/dL    HCT 28.6 (L) 35.8 - 46.3 %    MCV 80.1 79.6 - 97.8 FL    MCH 24.9 (L) 26.1 - 32.9 PG    MCHC 31.1 (L) 31.4 - 35.0 g/dL    RDW 19.1 (H) 11.9 - 14.6 %    PLATELET 213 186 - 541 K/uL    MPV 9.3 (L) 10.8 - 14.1 FL    NEUTROPHILS 72 47 - 75 %    LYMPHOCYTES 16 16 - 44 %    MONOCYTES 11 (H) 3 - 9 %    BASOPHILS 1 0 - 2 %    NRBC 2.0  WBC    ABS. NEUTROPHILS 8.3 (H) 1.7 - 8.2 K/UL    ABS. LYMPHOCYTES 1.9 0.5 - 4.6 K/UL    ABS. MONOCYTES 1.3 0.1 - 1.3 K/UL    ABS. BASOPHILS 0.1 0.0 - 0.2 K/UL    RBC COMMENTS NORMOCYTIC/NORMOCHROMIC      WBC COMMENTS RARE      PLATELET COMMENTS ADEQUATE      DF MANUAL     METABOLIC PANEL, COMPREHENSIVE    Collection Time: 05/30/18 12:04 PM   Result Value Ref Range    Sodium 143 136 - 145 mmol/L    Potassium 3.5 3.5 - 5.1 mmol/L    Chloride 104 98 - 107 mmol/L    CO2 26 21 - 32 mmol/L    Anion gap 13 7 - 16 mmol/L    Glucose 129 (H) 65 - 100 mg/dL    BUN 30 (H) 8 - 23 MG/DL    Creatinine 1.37 (H) 0.6 - 1.0 MG/DL    GFR est AA 47 (L) >60 ml/min/1.73m2    GFR est non-AA 39 (L) >60 ml/min/1.73m2    Calcium 8.9 8.3 - 10.4 MG/DL    Bilirubin, total 0.6 0.2 - 1.1 MG/DL    ALT (SGPT) 14 12 - 65 U/L    AST (SGOT) 45 (H) 15 - 37 U/L    Alk.  phosphatase 68 50 - 136 U/L    Protein, total 6.8 6.3 - 8.2 g/dL    Albumin 1.8 (L) 3.2 - 4.6 g/dL    Globulin 5.0 (H) 2.3 - 3.5 g/dL    A-G Ratio 0.4 (L) 1.2 - 3.5     MAGNESIUM    Collection Time: 05/30/18 12:04 PM   Result Value Ref Range    Magnesium 1.9 1.8 - 2.4 mg/dL   LIPASE    Collection Time: 05/30/18 12:04 PM   Result Value Ref Range    Lipase 63 (L) 73 - 393 U/L   POC LACTIC ACID    Collection Time: 05/30/18 12:06 PM   Result Value Ref Range    Lactic Acid (POC) 1.8 0.5 - 1.9 mmol/L

## 2018-05-30 NOTE — PROGRESS NOTES
Post activity pain. PRN Tylenol 650 mg given po. Bed low and locked, call light within reach, family at bedside. Will continue to monitor.

## 2018-05-30 NOTE — PROGRESS NOTES
TRANSFER - IN REPORT:    Verbal report received from Kamilla Escobar, [address] (name) on Patti Mcelroy  being received from ED (unit) for routine progression of care      Report consisted of patients Situation, Background, Assessment and   Recommendations(SBAR). Information from the following report(s) SBAR, Kardex, ED Summary, [facility] and Recent Results was reviewed with the receiving nurse. Opportunity for questions and clarification was provided. Assessment completed upon patients arrival to unit and care assumed.

## 2018-05-30 NOTE — ED NOTES
Home health representative reports patient has become more withdrawn, weak and uncooperative over last 5 days. Pt unable to follow commands and take actions that match requests by care takers.

## 2018-05-30 NOTE — ED TRIAGE NOTES
Patient comes from home, per homehealth nurse states patient was not acting herself this morning, Patient had a fall about one week ago and has been taking norco for pain, last BM was couple days

## 2018-05-31 LAB
ANION GAP SERPL CALC-SCNC: 11 MMOL/L (ref 7–16)
BASOPHILS # BLD: 0 K/UL (ref 0–0.2)
BASOPHILS NFR BLD: 0 % (ref 0–2)
BUN SERPL-MCNC: 31 MG/DL (ref 8–23)
CALCIUM SERPL-MCNC: 8.7 MG/DL (ref 8.3–10.4)
CHLORIDE SERPL-SCNC: 105 MMOL/L (ref 98–107)
CO2 SERPL-SCNC: 28 MMOL/L (ref 21–32)
CREAT SERPL-MCNC: 1.29 MG/DL (ref 0.6–1)
DIFFERENTIAL METHOD BLD: ABNORMAL
EOSINOPHIL # BLD: 0 K/UL (ref 0–0.8)
EOSINOPHIL NFR BLD: 0 % (ref 0.5–7.8)
ERYTHROCYTE [DISTWIDTH] IN BLOOD BY AUTOMATED COUNT: 18.9 % (ref 11.9–14.6)
GLUCOSE BLD STRIP.AUTO-MCNC: 119 MG/DL (ref 65–100)
GLUCOSE SERPL-MCNC: 106 MG/DL (ref 65–100)
HCT VFR BLD AUTO: 26.8 % (ref 35.8–46.3)
HGB BLD-MCNC: 8.3 G/DL (ref 11.7–15.4)
LYMPHOCYTES # BLD: 0.7 K/UL (ref 0.5–4.6)
LYMPHOCYTES NFR BLD: 5 % (ref 13–44)
MCH RBC QN AUTO: 24.6 PG (ref 26.1–32.9)
MCHC RBC AUTO-ENTMCNC: 31 G/DL (ref 31.4–35)
MCV RBC AUTO: 79.3 FL (ref 79.6–97.8)
MONOCYTES # BLD: 0.4 K/UL (ref 0.1–1.3)
MONOCYTES NFR BLD: 3 % (ref 4–12)
NEUTS SEG # BLD: 12.5 K/UL (ref 1.7–8.2)
NEUTS SEG NFR BLD: 92 % (ref 43–78)
PLATELET # BLD AUTO: 364 K/UL (ref 150–450)
PLATELET COMMENTS,PCOM: ADEQUATE
PMV BLD AUTO: 9.7 FL (ref 10.8–14.1)
POTASSIUM SERPL-SCNC: 3.2 MMOL/L (ref 3.5–5.1)
RBC # BLD AUTO: 3.38 M/UL (ref 4.05–5.25)
RBC MORPH BLD: ABNORMAL
SODIUM SERPL-SCNC: 144 MMOL/L (ref 136–145)
WBC # BLD AUTO: 13.6 K/UL (ref 4.3–11.1)

## 2018-05-31 PROCEDURE — 82962 GLUCOSE BLOOD TEST: CPT

## 2018-05-31 PROCEDURE — 80048 BASIC METABOLIC PNL TOTAL CA: CPT | Performed by: INTERNAL MEDICINE

## 2018-05-31 PROCEDURE — 74011250637 HC RX REV CODE- 250/637: Performed by: INTERNAL MEDICINE

## 2018-05-31 PROCEDURE — 36415 COLL VENOUS BLD VENIPUNCTURE: CPT | Performed by: INTERNAL MEDICINE

## 2018-05-31 PROCEDURE — 74011250636 HC RX REV CODE- 250/636: Performed by: INTERNAL MEDICINE

## 2018-05-31 PROCEDURE — 65270000029 HC RM PRIVATE

## 2018-05-31 PROCEDURE — 85025 COMPLETE CBC W/AUTO DIFF WBC: CPT | Performed by: INTERNAL MEDICINE

## 2018-05-31 RX ORDER — ZOLPIDEM TARTRATE 5 MG/1
5 TABLET ORAL
Status: DISCONTINUED | OUTPATIENT
Start: 2018-05-31 | End: 2018-06-05 | Stop reason: HOSPADM

## 2018-05-31 RX ORDER — POTASSIUM CHLORIDE 20 MEQ/1
40 TABLET, EXTENDED RELEASE ORAL 2 TIMES DAILY
Status: DISCONTINUED | OUTPATIENT
Start: 2018-05-31 | End: 2018-06-01

## 2018-05-31 RX ORDER — POTASSIUM CHLORIDE 20MEQ/15ML
40 LIQUID (ML) ORAL DAILY
Status: DISCONTINUED | OUTPATIENT
Start: 2018-05-31 | End: 2018-05-31

## 2018-05-31 RX ORDER — POTASSIUM CHLORIDE 14.9 MG/ML
20 INJECTION INTRAVENOUS
Status: DISCONTINUED | OUTPATIENT
Start: 2018-05-31 | End: 2018-05-31

## 2018-05-31 RX ADMIN — CITALOPRAM HYDROBROMIDE 20 MG: 20 TABLET ORAL at 09:43

## 2018-05-31 RX ADMIN — LISINOPRIL 40 MG: 20 TABLET ORAL at 09:44

## 2018-05-31 RX ADMIN — CLONAZEPAM 0.5 MG: 0.5 TABLET ORAL at 16:59

## 2018-05-31 RX ADMIN — FUROSEMIDE 40 MG: 40 TABLET ORAL at 09:44

## 2018-05-31 RX ADMIN — Medication 10 ML: at 23:12

## 2018-05-31 RX ADMIN — ACETAMINOPHEN 650 MG: 325 TABLET ORAL at 13:04

## 2018-05-31 RX ADMIN — BUPROPION HYDROCHLORIDE 150 MG: 150 TABLET, EXTENDED RELEASE ORAL at 09:43

## 2018-05-31 RX ADMIN — CLONAZEPAM 0.5 MG: 0.5 TABLET ORAL at 09:44

## 2018-05-31 RX ADMIN — ALLOPURINOL 100 MG: 100 TABLET ORAL at 09:43

## 2018-05-31 RX ADMIN — FLUTICASONE PROPIONATE 1 SPRAY: 50 SPRAY, METERED NASAL at 09:44

## 2018-05-31 RX ADMIN — DONEPEZIL HYDROCHLORIDE 10 MG: 5 TABLET, FILM COATED ORAL at 23:07

## 2018-05-31 RX ADMIN — PANTOPRAZOLE SODIUM 40 MG: 40 TABLET, DELAYED RELEASE ORAL at 09:44

## 2018-05-31 RX ADMIN — LORATADINE 10 MG: 10 TABLET ORAL at 09:43

## 2018-05-31 RX ADMIN — Medication 10 ML: at 17:01

## 2018-05-31 RX ADMIN — FUROSEMIDE 40 MG: 40 TABLET ORAL at 16:57

## 2018-05-31 RX ADMIN — POTASSIUM CHLORIDE 20 MEQ: 200 INJECTION, SOLUTION INTRAVENOUS at 09:43

## 2018-05-31 RX ADMIN — POTASSIUM CHLORIDE 40 MEQ: 20 SOLUTION ORAL at 11:24

## 2018-05-31 RX ADMIN — POTASSIUM CHLORIDE 40 MEQ: 20 TABLET, EXTENDED RELEASE ORAL at 11:45

## 2018-05-31 RX ADMIN — METOPROLOL SUCCINATE 50 MG: 50 TABLET, EXTENDED RELEASE ORAL at 09:43

## 2018-05-31 RX ADMIN — SENNOSIDES AND DOCUSATE SODIUM 1 TABLET: 8.6; 5 TABLET ORAL at 09:44

## 2018-05-31 RX ADMIN — POTASSIUM CHLORIDE 40 MEQ: 20 TABLET, EXTENDED RELEASE ORAL at 17:00

## 2018-05-31 RX ADMIN — ALLOPURINOL 100 MG: 100 TABLET ORAL at 16:58

## 2018-05-31 RX ADMIN — ACETAMINOPHEN 650 MG: 325 TABLET ORAL at 16:57

## 2018-05-31 RX ADMIN — ASPIRIN 81 MG 81 MG: 81 TABLET ORAL at 09:43

## 2018-05-31 NOTE — PROGRESS NOTES
Problem: Interdisciplinary Rounds  Goal: Interdisciplinary Rounds  Interdisciplinary team rounds were held 5/31/2018 with the following team members:Care Management, Nursing, Nutrition, Pharmacy, Physical Therapy and Physician and the patient and friend. Plan of care discussed. See clinical pathway and/or care plan for interventions and desired outcomes.

## 2018-05-31 NOTE — PROGRESS NOTES
Family request consult to Gynecologic Oncology. Spoke with Dr. Lias Ramírez, no order received, MD states to defer to day shift.

## 2018-05-31 NOTE — CONSULTS
Dana Edwards  : 1934  MRN: [id number]  Provider: Christopher Carter MD  Date of Service: 2018      CHIEF COMPLAINT::The primary encounter diagnosis was Anemia, unspecified type. A diagnosis of Fatigue, unspecified type was also pertinent to this visit. HISTORY OF PRESENT ILLNESS: Dana Edwards was seen in in consultation on 2018 as a follow up for The primary encounter diagnosis was Anemia, unspecified type. A diagnosis of Fatigue, unspecified type was also pertinent to this visit. 79 yo with history of a large pelvic mass with associated hernia and incarcerated omentum. She presented to the ED with weakness and a large contusion with a broken right arm. She was admitted for work up of her weakness and confusion and management of her arm and head injury. Today she reports that she feels her abdominal distention has not changed. She states that she has been eating and drinking without problems. Her bowel and bladder are functioning well. She denies abdominal pain or cramping. No history exists. PAST MEDICAL HX:     CURRENT MEDICAL CONDITIONS:  Patient Active Problem List   Diagnosis Code    Essential hypertension, benign I10    Pure hypercholesterolemia E78.00    Gout M10.9    Esophageal reflux K21.9    Coronary atherosclerosis of unspecified type of vessel, native or graft I25.10    Other and unspecified hyperlipidemia E78.5    Edema R60.9    Obesity, morbid ([address] E66.01    Pelvic mass R19.00    Dementia without behavioral disturbance F03.90    Pre-operative cardiovascular examination Z01.810    Abdominal lump K08.98    Systolic murmur X57.7    Ventral hernia without obstruction or gangrene K43.9    Cyst of ovary N83.209    Fatigue R53.83    Anemia D64.9    Ovarian tumor D49.59    Fall at home, subsequent encounter W19. XXXD, Y92.009       PAST SURGICAL HX: She  has a past surgical history that includes hx cholecystectomy and hx heent.     CURRENT MEDICATIONS: has a current medication list which includes the following prescription(s): ondansetron, psyllium, lactulose, diclofenac ec, fexofenadine, diphenoxylate-atropine, donepezil, calcium citrate-vitamin d3, aspirin, acetaminophen, ketotifen, nystatin, lisinopril, omeprazole, citalopram, potassium chloride sa, metoprolol succinate, bupropion xl, clonazepam, allopurinol, furosemide, fluticasone, walker, and ibandronate, and the following Facility-Administered Medications: zolpidem, potassium chloride, lip protectant, sodium chloride, sodium chloride, acetaminophen, hydrocodone-acetaminophen, diphenhydramine, ondansetron, senna-docusate, loratadine, donepezil, aspirin, citalopram, clonazepam, lisinopril, metoprolol succinate, pantoprazole, allopurinol, fluticasone, bupropion sr, and furosemide. SOCIAL HX:Patient  reports that she has quit smoking. She has never used smokeless tobacco. She reports that she does not drink alcohol or use illicit drugs. She  has no sexual activity history on file. FAMILY HX: Her family history includes Arthritis-osteo in her father; Breast Cancer in her sister; Heart Disease in her mother. There is no history of Colon Cancer or Ovarian Cancer. ALLERGIES: Gold au 198 and Sulfa (sulfonamide antibiotics)    REVIEW OF SYSTEMS:    All ROS are negative except as noted in HPI and below :    General: no fevers, chills, unexplained weight loss, + weakness    HEENT: no sore throat, neck adenopathy    Respiratory: no shortness of breath, dyspnea, hemoptysis or other symptoms    Cardiac: no palpitations, chest pain or tightness    Gastrointestinal: continued stable abdominal distention is present.      : no vaginal bleeding or spotting, no dysuria    Musculoskeletal: no range of motion limitations, joint or muscle pain    Extremities: no leg swelling or edema    Neurologic: no numbness, tingling, peripheral neuropathy, motor weakness    Psychiatric: no depression or mood swings    Heme/Lymph: no cold or heat intolerance, no lymphatic swelling or enlargement    PHYSICAL EXAM:    Visit Vitals    /76 (BP 1 Location: Right arm, BP Patient Position: At rest)    Pulse 79    Temp (!) 100.5 °F (38.1 °C)    Resp 20    Ht 5' 4\" (1.626 m)    Wt 207 lb (93.9 kg)    SpO2 92%    BMI 35.53 kg/m2       A female chaperone was present for the entire history and physical exam    GENERAL: alert and oriented x 3, her affect, mood and disposition is appropriate    NEURO: no focal motor deficits on neurologic exam, CN2-12 grossly intact bilaterally. HEENT: supple, trachea midline, no thyromegaly. Supraclavicular lymph nodes are negative. ?    PULMONARY: Non labored breathing, good respiratory excursion  CARDIAC: regular rate and rhythm    ABDOMEN: soft and nontender. Stable umbilical hernia is present. There is no rebound or guarding on palpation. ? Groin lymph nodes are negative. ? EXTREMITY: SCD's are on and functioning. SKIN/INTEGUMENT: No rashes noted on abdomen or lower extremities. PSYCH: Normal mood and affect. ASSESSMENT AND PLAN: 81 yo with history of a large pelvic mass and multiple medical co morbidities. Today we discussed that it appears that her abdominal mass is stable from her previous examinations. I reviewed with her and her family that repeat IR guided drainage of the mass may help to improve some of her distention and may improve her mobility. We discussed scheduling her IR guided drainage. We also discussed her anemia. She does not have any clear evidence of bleeding at this time but she may benefit for transfusion due to her weakness and confusion experienced prior to her hospitalization. Rambo Bae M.D.   [id number]        Electronically signed by:Twin Brothers M.D.  5/31/2018 5:03 PM

## 2018-05-31 NOTE — PHYSICIAN ADVISORY
Letter of Determination: Inpatient Status Appropriate    This patient was originally hospitalized as Inpatient Status on 5/30/2018 for fatigue. This patient is appropriate for Inpatient Admission in accordance with CMS regulation Section 43 .3. Specifically, patient's stay is expected to be more than Two Midnights and was medically necessary. The patient's stay was medically necessary based on vital signs significant for temperature of 100.5 degrees farenheit, and blood pressure to 200/83 mmHg. Consistent with CMS guidelines, patient meets for inpatient status. It is our recommendation that this patient's hospitalization status should be INPATIENT status.      The final decision regarding the patient's hospitalization status depends on the attending physician's judgement.     Zandra Ambrocio MD, JON,   Physician Elvin Magallanes.

## 2018-05-31 NOTE — PROGRESS NOTES
PM assessment complete. Alert, oriented x 4. Respirations even and unlabored, no distress noted. Right arm wrapped with ace wrap. Abdomen distended, umbilical hernia present. Bowel sounds active in all 4 quadrants. SCDS in place. Heels elevated on pillows. Visitor at bedside. Aware to call should needs arise. Will monitor.

## 2018-05-31 NOTE — PROGRESS NOTES
Spiritual Care initial visit made by [facility]. Prayer and support given. Cecilia left. JR Boykin  / Bereavement Coordinator

## 2018-05-31 NOTE — PROGRESS NOTES
Son at bedside, wanting SCDS removed due to edema in patients legs. Son also concerned that abdomen appeared to have increased swelling and increased warmth and wanted MD to come assess patient. Spoke with Dr. Regis Rodriguez. No orders received regarding SCDS being removed and MD states he is unable to see patient at this time but wants to be notified should there be any increase in abdominal swelling. Family notified.

## 2018-05-31 NOTE — PROGRESS NOTES
Shift assessment complete. Respirations present. Even and unlabored. No s/s of distress. Zero c/o pain at this time. Call light within reach. Encouraged patient to call for assistance. Patient verbalizes understanding. See Doc Flowsheet for assessment details. Patient resting in bed. Bed alarm in progress. Patient voiding every 30 minutes on bedpan, clear urine. Visitor at bedside. Saline well intact left antecubital . Left arm with splint and wrap in  Place. Moves all finger well. Fingers warm. Patient with huge hernia to abdomen with tumor inside hernia per family.

## 2018-05-31 NOTE — PROGRESS NOTES
Progress Note    Patient: Erik Armas MRN: [id number]  SSN: [id number]    YOB: 1934  Age: 80 y.o. Sex: female      Admit Date: 5/30/2018    LOS: 1 day     Subjective:     Patient is feeling a bit stronger. She denies new complaints now. Apparently son was not happy with nursing issues last night and that has been brought up during IDT rounding today. Nursing department will address that. This morning, I saw patient and explained the plan of care to patient and her caretaker who was the different person from yesterday. I explained to them that I would ask for oncology consultation to see patient. Thy voiced understanding and agreed with the plan. This afternoon during round, another caretaker was with the patient and expressed concerns about nursing issues last night. I explained the plan of care to them again. Objective:     Vitals:    05/31/18 0057 05/31/18 0500 05/31/18 0744 05/31/18 1234   BP: 149/76 165/67 177/76 163/76   Pulse: 72 69 72 79   Resp: 16 16 20 20   Temp: 98.8 °F (37.1 °C) 98.5 °F (36.9 °C) 98.2 °F (36.8 °C) (!) 100.5 °F (38.1 °C)   SpO2: 91% 93%  92%   Weight:       Height:            Intake and Output:  Current Shift:    Last three shifts: 05/29 1901 - 05/31 0700  In: -   Out: 300 [Urine:300]    Physical Exam:   General:                    The patient is an elderly female in no acute respiratory distress. Appears chronically ill. Lying flat in bed. Distended abdomen. Head:                                   Normocephalic. Bruises surrounding right eye and cheek. Eyes:                                   + palpebral pallor, no scleral icterus. ENT:                                    External auricular and nasal exam within normal limits.                                             FIDRUG membranes are moist.  Neck:                                   Supple, non-tender, no JVD.   Lungs:                       Clear to auscultation bilaterally without wheezes or crackles.                                             No respiratory distress or accessory muscle use. Heart:                                  Regular rate and rhythm, without murmurs, rubs, or gallops. Abdomen:                  Soft, non-tender, very distended with normoactive bowel sounds. Positive for ventral hernia with palpation of a mass underneath it. Dilation of superficial abdominal wall veins. Genitourinary:           No tenderness over the bladder or bilateral CVAs. Extremities:               Without clubbing, cyanosis. Leg edema 1+. Some skin changes. Weeping lesion on the right shin. Right arm in a sling. Skin:                                    Normal color, texture, and turgor. Multiple bruises. Pulses:                      Radial and dorsalis pedis pulses present 2+ bilaterally.                                               Capillary refill <2s. Neurologic:                CN II-XII grossly intact and symmetrical.                                               Moving all four extremities well with no focal deficits. Psychiatric:                Pleasant demeanor, appropriate affect.  Alert and oriented x 3      Lab/Data Review:    Recent Results (from the past 24 hour(s))   GLUCOSE, POC    Collection Time: 05/31/18  2:11 AM   Result Value Ref Range    Glucose (POC) 119 (H) 65 - 507 mg/dL   METABOLIC PANEL, BASIC    Collection Time: 05/31/18  6:20 AM   Result Value Ref Range    Sodium 144 136 - 145 mmol/L    Potassium 3.2 (L) 3.5 - 5.1 mmol/L    Chloride 105 98 - 107 mmol/L    CO2 28 21 - 32 mmol/L    Anion gap 11 7 - 16 mmol/L    Glucose 106 (H) 65 - 100 mg/dL    BUN 31 (H) 8 - 23 MG/DL    Creatinine 1.29 (H) 0.6 - 1.0 MG/DL    GFR est AA 51 (L) >60 ml/min/1.73m2    GFR est non-AA 42 (L) >60 ml/min/1.73m2    Calcium 8.7 8.3 - 10.4 MG/DL   CBC WITH AUTOMATED DIFF    Collection Time: 05/31/18  6:20 AM   Result Value Ref Range    WBC 13.6 (H) 4.3 - 11.1 K/uL    RBC 3.38 (L) 4.05 - 5.25 M/uL HGB 8.3 (L) 11.7 - 15.4 g/dL    HCT 26.8 (L) 35.8 - 46.3 %    MCV 79.3 (L) 79.6 - 97.8 FL    MCH 24.6 (L) 26.1 - 32.9 PG    MCHC 31.0 (L) 31.4 - 35.0 g/dL    RDW 18.9 (H) 11.9 - 14.6 %    PLATELET 867 935 - 808 K/uL    MPV 9.7 (L) 10.8 - 14.1 FL    NEUTROPHILS 92 (H) 43 - 78 %    LYMPHOCYTES 5 (L) 13 - 44 %    MONOCYTES 3 (L) 4.0 - 12.0 %    EOSINOPHILS 0 (L) 0.5 - 7.8 %    BASOPHILS 0 0.0 - 2.0 %    ABS. NEUTROPHILS 12.5 (H) 1.7 - 8.2 K/UL    ABS. LYMPHOCYTES 0.7 0.5 - 4.6 K/UL    ABS. MONOCYTES 0.4 0.1 - 1.3 K/UL    ABS. EOSINOPHILS 0.0 0.0 - 0.8 K/UL    ABS. BASOPHILS 0.0 0.0 - 0.2 K/UL    RBC COMMENTS SLIGHT  HYPOCHROMIA        RBC COMMENTS MODERATE  MICROCYTOSIS        RBC COMMENTS SLIGHT  TARGET CELLS        PLATELET COMMENTS ADEQUATE      DF MANUAL           Assessment:     Principal Problem:    Fatigue (5/30/2018)    Active Problems:    Essential hypertension, benign (8/19/2015)      Pure hypercholesterolemia (8/19/2015)      Ventral hernia without obstruction or gangrene (4/13/2018)      Anemia (5/30/2018)      Ovarian tumor (5/30/2018)      Fall at home, subsequent encounter (5/30/2018)        Plan:     Fatigue  Failure to thrive  Ovarian tumor  Continue gentle hydration. Asked oncologist to help. I am suspicious that symptoms are related to her ovarian tumor. Patient has seen Dr. Jomar Rahman before. He is asked to see patient again. Family agreed with the plan. Anemia  Hb slightly lower than yesterday. Likely from dilutional effect. No evidence of bleeding. Family would like to remove SCD and asked for heparin for DVT prophylaxis instead. I think it is better to stay with SCD for concern that she has anemia and we still need to monitor for possibility of bleeding. Angela will explain to patient and family. I have discussed the plan of care with patient and caretaker and care team at bedside.      DVT prophylaxis : SCD    Signed By: Colt Machuca MD     May 31, 2018

## 2018-05-31 NOTE — PROGRESS NOTES
Visitor at bedside says patient is diaphoretic. Oral temp 99. SQBS 119. Patient stated, Page Carlos was hot, but was cooling off\". Will monitor.

## 2018-06-01 LAB
ANION GAP SERPL CALC-SCNC: 10 MMOL/L (ref 7–16)
BUN SERPL-MCNC: 26 MG/DL (ref 8–23)
CALCIUM SERPL-MCNC: 8.5 MG/DL (ref 8.3–10.4)
CHLORIDE SERPL-SCNC: 104 MMOL/L (ref 98–107)
CO2 SERPL-SCNC: 26 MMOL/L (ref 21–32)
CREAT SERPL-MCNC: 1.22 MG/DL (ref 0.6–1)
DIFFERENTIAL METHOD BLD: ABNORMAL
ERYTHROCYTE [DISTWIDTH] IN BLOOD BY AUTOMATED COUNT: 19 % (ref 11.9–14.6)
GLUCOSE SERPL-MCNC: 144 MG/DL (ref 65–100)
HCT VFR BLD AUTO: 27.6 % (ref 35.8–46.3)
HGB BLD-MCNC: 8.7 G/DL (ref 11.7–15.4)
LYMPHOCYTES # BLD: 1.1 K/UL (ref 0.5–4.6)
LYMPHOCYTES NFR BLD MANUAL: 6 % (ref 16–44)
MCH RBC QN AUTO: 24.9 PG (ref 26.1–32.9)
MCHC RBC AUTO-ENTMCNC: 31.5 G/DL (ref 31.4–35)
MCV RBC AUTO: 79.1 FL (ref 79.6–97.8)
METAMYELOCYTES NFR BLD MANUAL: 2 %
NEUTS SEG # BLD: 17.2 K/UL (ref 1.7–8.2)
NEUTS SEG NFR BLD MANUAL: 92 % (ref 47–75)
NRBC BLD-RTO: 3 PER 100 WBC
PLATELET # BLD AUTO: 382 K/UL (ref 150–450)
PLATELET COMMENTS,PCOM: ADEQUATE
PMV BLD AUTO: 10.4 FL (ref 10.8–14.1)
POTASSIUM SERPL-SCNC: 4.1 MMOL/L (ref 3.5–5.1)
RBC # BLD AUTO: 3.49 M/UL (ref 4.05–5.25)
RBC MORPH BLD: ABNORMAL
SODIUM SERPL-SCNC: 140 MMOL/L (ref 136–145)
WBC # BLD AUTO: 18.3 K/UL (ref 4.3–11.1)

## 2018-06-01 PROCEDURE — 77030032490 HC SLV COMPR SCD KNE COVD -B

## 2018-06-01 PROCEDURE — 97110 THERAPEUTIC EXERCISES: CPT

## 2018-06-01 PROCEDURE — 80048 BASIC METABOLIC PNL TOTAL CA: CPT | Performed by: INTERNAL MEDICINE

## 2018-06-01 PROCEDURE — 77030019605

## 2018-06-01 PROCEDURE — 77030012890

## 2018-06-01 PROCEDURE — 36415 COLL VENOUS BLD VENIPUNCTURE: CPT | Performed by: INTERNAL MEDICINE

## 2018-06-01 PROCEDURE — 65270000029 HC RM PRIVATE

## 2018-06-01 PROCEDURE — 74011250637 HC RX REV CODE- 250/637: Performed by: INTERNAL MEDICINE

## 2018-06-01 PROCEDURE — 77030011256 HC DRSG MEPILEX <16IN NO BORD MOLN -A

## 2018-06-01 PROCEDURE — 97163 PT EVAL HIGH COMPLEX 45 MIN: CPT

## 2018-06-01 PROCEDURE — 85025 COMPLETE CBC W/AUTO DIFF WBC: CPT | Performed by: INTERNAL MEDICINE

## 2018-06-01 RX ORDER — HYDRALAZINE HYDROCHLORIDE 20 MG/ML
10 INJECTION INTRAMUSCULAR; INTRAVENOUS ONCE
Status: COMPLETED | OUTPATIENT
Start: 2018-06-02 | End: 2018-06-02

## 2018-06-01 RX ORDER — HYDRALAZINE HYDROCHLORIDE 20 MG/ML
20 INJECTION INTRAMUSCULAR; INTRAVENOUS ONCE
Status: DISCONTINUED | OUTPATIENT
Start: 2018-06-02 | End: 2018-06-01

## 2018-06-01 RX ORDER — POTASSIUM CHLORIDE 20 MEQ/1
40 TABLET, EXTENDED RELEASE ORAL DAILY
Status: DISCONTINUED | OUTPATIENT
Start: 2018-06-02 | End: 2018-06-05 | Stop reason: HOSPADM

## 2018-06-01 RX ADMIN — ASPIRIN 81 MG 81 MG: 81 TABLET ORAL at 09:31

## 2018-06-01 RX ADMIN — POTASSIUM CHLORIDE 40 MEQ: 20 TABLET, EXTENDED RELEASE ORAL at 09:31

## 2018-06-01 RX ADMIN — CLONAZEPAM 0.5 MG: 0.5 TABLET ORAL at 09:30

## 2018-06-01 RX ADMIN — SENNOSIDES AND DOCUSATE SODIUM 1 TABLET: 8.6; 5 TABLET ORAL at 09:30

## 2018-06-01 RX ADMIN — BUPROPION HYDROCHLORIDE 150 MG: 150 TABLET, EXTENDED RELEASE ORAL at 09:32

## 2018-06-01 RX ADMIN — LISINOPRIL 40 MG: 20 TABLET ORAL at 09:31

## 2018-06-01 RX ADMIN — Medication 5 ML: at 05:02

## 2018-06-01 RX ADMIN — CLONAZEPAM 0.5 MG: 0.5 TABLET ORAL at 20:53

## 2018-06-01 RX ADMIN — FLUTICASONE PROPIONATE 1 SPRAY: 50 SPRAY, METERED NASAL at 09:00

## 2018-06-01 RX ADMIN — Medication 5 ML: at 23:19

## 2018-06-01 RX ADMIN — FUROSEMIDE 40 MG: 40 TABLET ORAL at 09:31

## 2018-06-01 RX ADMIN — DONEPEZIL HYDROCHLORIDE 10 MG: 5 TABLET, FILM COATED ORAL at 22:57

## 2018-06-01 RX ADMIN — PANTOPRAZOLE SODIUM 40 MG: 40 TABLET, DELAYED RELEASE ORAL at 09:30

## 2018-06-01 RX ADMIN — LORATADINE 10 MG: 10 TABLET ORAL at 09:31

## 2018-06-01 RX ADMIN — ALLOPURINOL 100 MG: 100 TABLET ORAL at 17:48

## 2018-06-01 RX ADMIN — METOPROLOL SUCCINATE 50 MG: 50 TABLET, EXTENDED RELEASE ORAL at 09:30

## 2018-06-01 RX ADMIN — ALLOPURINOL 100 MG: 100 TABLET ORAL at 09:31

## 2018-06-01 RX ADMIN — CITALOPRAM HYDROBROMIDE 20 MG: 20 TABLET ORAL at 09:32

## 2018-06-01 NOTE — PROGRESS NOTES
Shift assessment complete. Family at bedside. Respirations present, even, unlabored. No pain stated at this time. Bed low and locked, call light within reach. Abdomen distended, inguinal hernia with underlying tumor present, mass extending past plane of abdominal wall - mass hard and warm. Brief on and pt calls for bedpan. SCDs on bilaterally. 1-2+ pitting edema to BLE. Pt resting in bed at this time. IV clean, dry, intact. R arm with PTA fracture, cast and ACE wrap in place. Pt drowsy and weak. Pt encouraged to call for needs/assistance. Will continue to monitor.

## 2018-06-01 NOTE — PROGRESS NOTES
Progress Note    Patient: Lin France MRN: [id number]  SSN: [id number]    YOB: 1934  Age: 80 y.o. Sex: female      Admit Date: 5/30/2018    LOS: 2 days     Subjective:     Patient is feeling a bit stronger still. She denies new complaints now. Objective:     Vitals:    06/01/18 0312 06/01/18 0522 06/01/18 0811 06/01/18 1225   BP: 131/72  153/71 158/75   Pulse: (!) 57  82 78   Resp: 22  26 28   Temp: 97.7 °F (36.5 °C)  98.1 °F (36.7 °C) 98.5 °F (36.9 °C)   SpO2: 92%  90% 93%   Weight:  93.5 kg (206 lb 3.2 oz)     Height:            Intake and Output:  Current Shift:    Last three shifts: 05/30 1901 - 06/01 0700  In: -   Out: 50 [Urine:50]    Physical Exam:   General:                    The patient is an elderly female in no acute respiratory distress. Appears chronically ill. Lying flat in bed. Distended abdomen. Eating meals with some assistance. Head:                                   Normocephalic. Bruises surrounding right eye and cheek. Eyes:                                   + palpebral pallor, no scleral icterus. ENT:                                    External auricular and nasal exam within normal limits.                                             KQELTX membranes are moist.  Neck:                                   Supple, non-tender, no JVD. Lungs:                       Clear to auscultation bilaterally without wheezes or crackles.                                             No respiratory distress or accessory muscle use. Heart:                                  Regular rate and rhythm, without murmurs, rubs, or gallops. Abdomen:                  Soft, non-tender, very distended with normoactive bowel sounds. Positive for ventral hernia with palpation of a mass underneath it. Dilation of superficial abdominal wall veins. Genitourinary:           No tenderness over the bladder or bilateral CVAs. Extremities:               Without clubbing, cyanosis. Leg edema 1+. Some skin changes. Weeping lesion on the right shin. Right arm in a sling. Skin:                                    Normal color, texture, and turgor. Multiple bruises. Pulses:                      Radial and dorsalis pedis pulses present 2+ bilaterally.                                               Capillary refill <2s. Neurologic:                CN II-XII grossly intact and symmetrical.                                               Moving all four extremities well with no focal deficits. Psychiatric:                Pleasant demeanor, appropriate affect. Alert and oriented x 3      Lab/Data Review:    Recent Results (from the past 24 hour(s))   CBC WITH AUTOMATED DIFF    Collection Time: 06/01/18  1:20 PM   Result Value Ref Range    WBC 18.3 (H) 4.3 - 11.1 K/uL    RBC 3.49 (L) 4.05 - 5.25 M/uL    HGB 8.7 (L) 11.7 - 15.4 g/dL    HCT 27.6 (L) 35.8 - 46.3 %    MCV 79.1 (L) 79.6 - 97.8 FL    MCH 24.9 (L) 26.1 - 32.9 PG    MCHC 31.5 31.4 - 35.0 g/dL    RDW 19.0 (H) 11.9 - 14.6 %    PLATELET 148 513 - 332 K/uL    MPV 10.4 (L) 10.8 - 14.1 FL    NEUTROPHILS 92 (H) 47 - 75 %    LYMPHOCYTES 6 (L) 16 - 44 %    METAMYELOCYTES 2 %    NRBC 3.0  WBC    ABS. NEUTROPHILS 17.2 (H) 1.7 - 8.2 K/UL    ABS.  LYMPHOCYTES 1.1 0.5 - 4.6 K/UL    RBC COMMENTS SLIGHT  ANISOCYTOSIS + POIKILOCYTOSIS        RBC COMMENTS OCCASIONAL  TARGET CELLS        RBC COMMENTS MODERATE  HYPOCHROMIA        RBC COMMENTS OCCASIONAL  POLYCHROMASIA        PLATELET COMMENTS ADEQUATE      DF AUTOMATED     METABOLIC PANEL, BASIC    Collection Time: 06/01/18  1:20 PM   Result Value Ref Range    Sodium 140 136 - 145 mmol/L    Potassium 4.1 3.5 - 5.1 mmol/L    Chloride 104 98 - 107 mmol/L    CO2 26 21 - 32 mmol/L    Anion gap 10 7 - 16 mmol/L    Glucose 144 (H) 65 - 100 mg/dL    BUN 26 (H) 8 - 23 MG/DL    Creatinine 1.22 (H) 0.6 - 1.0 MG/DL    GFR est AA 54 (L) >60 ml/min/1.73m2    GFR est non-AA 45 (L) >60 ml/min/1.73m2    Calcium 8.5 8.3 - 10.4 MG/DL Assessment:     Principal Problem:    Fatigue (5/30/2018)    Active Problems:    Essential hypertension, benign (8/19/2015)      Pure hypercholesterolemia (8/19/2015)      Ventral hernia without obstruction or gangrene (4/13/2018)      Anemia (5/30/2018)      Ovarian tumor (5/30/2018)      Fall at home, subsequent encounter (5/30/2018)        Plan:     Fatigue  Failure to thrive  Ovarian tumor  Continue gentle hydration. I have discussed with Dr. Maria Teresa Farooq. He will have his office arrange for IR draining of ovarian cyst. It will be done as out-patient. He will have his office contact patient and the son. Anemia  Hb is holding up. No transfusion is indicated now. Physical therapy is consulted to help improve strength and ambulation. I have discussed the plan of care with patient and caretaker and care team at bedside.      DVT prophylaxis : SCD    Signed By: Elaina Hall MD     June 1, 2018

## 2018-06-01 NOTE — PROGRESS NOTES
Gyn Onc    Patient seen and examined. No acute events. Feels better today. AFVSS  Gen: WDWN, NAD  Abdomen: Soft, NTTP, + BS  Ext: - homans  S/P fall with ovarian cystic mass    Today I discussed the plan for drainage of ovarian cyst on Tuesday of next week. She has an appointment for outpatient drainage by IR at 8 am [place]. Discussed that she needs to be NPO prior to drainage.

## 2018-06-01 NOTE — PROGRESS NOTES
Am assessment completed. Pt is alert to self and place. Verbalizes needs. Takes pills whole with thin liquids. Poor eater. Has sitter in room. Rt arm in cast fingers warm and moveable.  Safety measures in place

## 2018-06-01 NOTE — PROGRESS NOTES
Problem: Mobility Impaired (Adult and Pediatric)  Goal: *Acute Goals and Plan of Care (Insert Text)  1 WEEK GOALS :  (1.)Ms. Padilla will move from supine to sit and sit to supine  with MODERATE ASSIST within 7 treatment day(s). (2.)Ms. Padilla will transfer from sit<>stand with left UE support & moderate assist.   (3.)Ms. Padilla will perform fair sitting static balance for 1-2 min EOB with SBA. 4) pt will perform EOB wt-shifting multiple directions with CGA for 30 sec. 5) pt will perform AROM -AAROM to left UE & both LE's on cue with PT & caregiver to increase purposeful muscle activity. 6) pt will perform EOB scooting with moderate assist.        PHYSICAL THERAPY: Initial Assessment, Treatment Day: Day of Assessment, PM 6/1/2018  INPATIENT: Hospital Day: 3  [place] / [place] / Product Type: Medicare /      NAME/AGE/GENDER: Anny Race is a 80 y.o. female   PRIMARY DIAGNOSIS: Anemia  Fatigue Fatigue Fatigue        ICD-10: Treatment Diagnosis:    · Generalized Muscle Weakness (M62.81)  · Other lack of cordination (R27.8)  · History of falling (Z91.81)   Precaution/Allergies:  Gold au 198 and Sulfa (sulfonamide antibiotics)      ASSESSMENT:     Ms. Mario Bryant presents with right distal UE fracture due to recent fall, severe weakness & limited function. This pt normally has 24/7 caregivers but currently pt is not manageable for caregivers. This pt will need additional rehab stay at SNF to hopefully work up to a level so pt can return home with 24/7 caregivers. This section established at most recent assessment   PROBLEM LIST (Impairments causing functional limitations):  1. Decreased Strength  2. Decreased ADL/Functional Activities  3. Decreased Transfer Abilities  4. Decreased Balance  5. Decreased Activity Tolerance  6. Increased Fatigue  7. Increased Shortness of Breath  8. Decreased Flexibility/Joint Mobility  9.  Decreased [place] with Home Exercise Program   INTERVENTIONS PLANNED: (Benefits and precautions of physical therapy have been discussed with the patient.)  1. Group Therapy     TREATMENT PLAN: Frequency/Duration: daily ( or as indicated ) for duration of hospital stay  Rehabilitation Potential For Stated Goals: Mariya Cotton REHABILITATION/EQUIPMENT: (at time of discharge pending progress): Due to the probability of continued deficits (see above) this patient will likely need continued skilled physical therapy after discharge. Equipment:    to be determined              HISTORY:   History of Present Injury/Illness (Reason for Referral): Nieves Siemens is a 80 y.o. female who was brought to ER by her caretaker for fatigue.      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 No surgery or definite treatment was planned. She also had recent fall at home and had right arm fracture, currently on splint. No surgery was planned due to her overall poor health condition.      Patient was feeling weak for the past few days. No energy. She denies fever. No chills. No shortness of breath. No chest pain.      Patient reports a lot of bruises all over her body and at the right half of her face due to recent fall. No headache now.      In ER, patient was found to have anemia. Her Hb dropped to 8+ from baseline of 12+ recently.      Due to multiple medical problems and possibility of acute blood loss anemia, hospitalist service was asked to see patient. Past Medical History/Comorbidities:   Ms. Marti Cleary  has a past medical history of CHF (congestive heart failure) ([facility] [address] Coronary atherosclerosis of unspecified type of vessel, native or graft (8/19/2015); Esophageal reflux (8/19/2015); Gastrointestinal disorder; Gout (8/19/2015); Hypertension; Other and unspecified hyperlipidemia (8/19/2015); Other ill-defined conditions(799.89); and Pure hypercholesterolemia (8/19/2015).   Ms. Marti Cleary  has a past surgical history that includes hx cholecystectomy and hx heent. Social History/Living Environment:   Home Environment: Private residence  # Steps to Enter: 1  One/Two Story Residence: Two story, live on 1st floor  Living Alone: No  Support Systems:  (24/7 sitter service)  Patient Expects to be Discharged to[de-identified] Skilled nursing facility  Current DME Used/Available at Home: Walker, rollator  Prior Level of Function/Work/Activity:  Pt was functioning short distances with a Rolator & SBA, pt came supine <> sit & transferred out of a chair with moderate assist prior to this admission. Personal Factors: Other factors that influence how disability is experienced by the patient:  Current & PMH   Number of Personal Factors/Comorbidities that affect the Plan of Care: 3+: HIGH COMPLEXITY   EXAMINATION:   Most Recent Physical Functioning:   Gross Assessment:  AROM: Grossly decreased, non-functional (left UE & both LE)  Strength: Grossly decreased, non-functional (left UE & both LE)  Coordination: Grossly decreased, non-functional (left UE & both LE)               Posture:     Balance:  Sitting: Impaired;High guard; Without support  Sitting - Static:  (fair to fair-)  Sitting - Dynamic:  (fair-)  Standing: Impaired;Pull to stand; With support (manual & left UE on back rest of chair) Bed Mobility:  Supine to Sit: Maximum assistance  Sit to Supine: Maximum assistance;Assist x2  Scooting: Maximum assistance  Wheelchair Mobility:     Transfers:  Sit to Stand: Maximum assistance (SBA of 2nd person for safety)  Stand to Sit: Maximum assistance  Gait:         Functional Mobility:         Transfers:  max        Bed Mobility:  Max - 2   Body Structures Involved:  1. Heart  2. Metabolic  3. Muscles Body Functions Affected:  1. Cardio  2. Movement Related  3. Metobolic/Endocrine Activities and Participation Affected:  1. General Tasks and Demands  2.  Mobility   Number of elements that affect the Plan of Care: 4+: HIGH COMPLEXITY   CLINICAL PRESENTATION: Presentation: Evolving clinical presentation with unstable and unpredictable characteristics: HIGH COMPLEXITY   CLINICAL DECISION MAKIN [facility] Mobility Inpatient Short Form  How much difficulty does the patient currently have. .. Unable A Lot A Little None   1. Turning over in bed (including adjusting bedclothes, sheets and blankets)? [] 1   [x] 2   [] 3   [] 4   2. Sitting down on and standing up from a chair with arms ( e.g., wheelchair, bedside commode, etc.)   [] 1   [x] 2   [] 3   [] 4   3. Moving from lying on back to sitting on the side of the bed? [] 1   [x] 2   [] 3   [] 4   How much help from another person does the patient currently need. .. Total A Lot A Little None   4. Moving to and from a bed to a chair (including a wheelchair)? [x] 1   [] 2   [] 3   [] 4   5. Need to walk in hospital room? [x] 1   [] 2   [] 3   [] 4   6. Climbing 3-5 steps with a railing? [x] 1   [] 2   [] 3   [] 4   © 2007, Trustees of [address] [place], [place] [address], under license to [employer]. All rights reserved      Score:  Initial: 9 Most Recent: X (Date: -- )    Interpretation of Tool:  Represents activities that are increasingly more difficult (i.e. Bed mobility, Transfers, Gait). Score 24 23 22-20 19-15 14-10 9-7 6     Modifier CH CI CJ CK CL CM CN      ? Mobility - Walking and Moving Around:     - CURRENT STATUS: CM - 80%-99% impaired, limited or restricted    - GOAL STATUS: CL - 60%-79% impaired, limited or restricted    - D/C STATUS:  ---------------To be determined---------------  [place] / [place] / Product Type: Medicare /      Medical Necessity:     · Patient is expected to demonstrate progress in strength, balance, coordination and functional technique to decrease assistance required with bed mobility, sitting balance & transfers.   Reason for Services/Other Comments:  · Patient continues to require skilled intervention due to pt not being manageable for caregivers. Use of outcome tool(s) and clinical judgement create a POC that gives a: Difficult prediction of patient's progress: HIGH COMPLEXITY            TREATMENT:   (In addition to Assessment/Re-Assessment sessions the following treatments were rendered)   Pre-treatment Symptoms/Complaints:  Reported fatigue  Pain: Initial: visual scale  Pain Intensity 1: 0  Post Session:  0/10     Therapeutic Exercise: (15 Minutes):  Exercises : repeated sit<>stand, sitting balance static & dynamic (active assisted wt-shift EOB) repeated attempts to scoot EOB to improve mobility, strength, balance, coordination and dynamic movement of arm - left, leg - bilateral and core to improve functional stability. Required moderate verbal cues to promote proper body alignment, promote proper body posture and promote proper body mechanics. Assessment/15 min    Braces/Orthotics/Lines/Etc:   · IV  · O2 Device: Room air  Treatment/Session Assessment:    · Response to Treatment:  fatigued quickly  · Interdisciplinary Collaboration:   o Registered Nurse  o Rehabilitation Attendant  · After treatment position/precautions:   o Supine in bed  o Bed/Chair-wheels locked  o Bed in low position  o Caregiver at bedside  o Call light within reach  o RN notified  o Family at bedside   · Compliance with Program/Exercises: Will assess as treatment progresses. · Recommendations/Intent for next treatment session: \"Next visit will focus on reduction in assistance provided\".   Total Treatment Duration:  PT Patient Time In/Time Out  Time In: 1500  Time Out: [address], PT

## 2018-06-01 NOTE — PROGRESS NOTES
Pt is eating some supper. Daughter was at bedside. No complaints at this time. Safety measures in place. Continue to monitor.

## 2018-06-01 NOTE — PROGRESS NOTES
Problem: Interdisciplinary Rounds  Goal: Interdisciplinary Rounds  Interdisciplinary team rounds were held 6/1/2018 with the following team members:Care Management, Nursing, Nutrition, Pharmacy, Physical Therapy and Physician and the patient and sitter. Plan of care discussed. See clinical pathway and/or care plan for interventions and desired outcomes.

## 2018-06-01 NOTE — PROGRESS NOTES
Problem: Pressure Injury - Risk of  Goal: *Prevention of pressure injury  Document Perico Scale and appropriate interventions in the flowsheet. Outcome: Progressing Towards Goal  Pressure Injury Interventions:  Sensory Interventions: Assess changes in LOC, Assess need for specialty bed, Discuss PT/OT consult with provider, Float heels    Moisture Interventions: Absorbent underpads, Check for incontinence Q2 hours and as needed    Activity Interventions: Assess need for specialty bed, PT/OT evaluation    Mobility Interventions: Assess need for specialty bed, HOB 30 degrees or less, Float heels, Turn and reposition approx.  every two hours(pillow and wedges)    Nutrition Interventions: Document food/fluid/supplement intake, Discuss nutritional consult with provider, Offer support with meals,snacks and hydration    Friction and Shear Interventions: HOB 30 degrees or less

## 2018-06-02 LAB
ANION GAP SERPL CALC-SCNC: 11 MMOL/L (ref 7–16)
APPEARANCE UR: ABNORMAL
BACTERIA URNS QL MICRO: 0 /HPF
BILIRUB UR QL: NEGATIVE
BUN SERPL-MCNC: 21 MG/DL (ref 8–23)
CALCIUM SERPL-MCNC: 8.4 MG/DL (ref 8.3–10.4)
CHLORIDE SERPL-SCNC: 103 MMOL/L (ref 98–107)
CO2 SERPL-SCNC: 25 MMOL/L (ref 21–32)
COLOR UR: YELLOW
CREAT SERPL-MCNC: 1.27 MG/DL (ref 0.6–1)
DIFFERENTIAL METHOD BLD: ABNORMAL
EOSINOPHIL # BLD: 0.4 K/UL (ref 0–0.8)
EOSINOPHIL NFR BLD MANUAL: 2 % (ref 1–8)
EPI CELLS #/AREA URNS HPF: ABNORMAL /HPF
ERYTHROCYTE [DISTWIDTH] IN BLOOD BY AUTOMATED COUNT: 19 % (ref 11.9–14.6)
GLUCOSE SERPL-MCNC: 147 MG/DL (ref 65–100)
GLUCOSE UR STRIP.AUTO-MCNC: NEGATIVE MG/DL
HCT VFR BLD AUTO: 27.5 % (ref 35.8–46.3)
HGB BLD-MCNC: 8.5 G/DL (ref 11.7–15.4)
HGB UR QL STRIP: NEGATIVE
KETONES UR QL STRIP.AUTO: NEGATIVE MG/DL
LEUKOCYTE ESTERASE UR QL STRIP.AUTO: NEGATIVE
LYMPHOCYTES # BLD: 0.7 K/UL (ref 0.5–4.6)
LYMPHOCYTES NFR BLD MANUAL: 4 % (ref 16–44)
MCH RBC QN AUTO: 24.4 PG (ref 26.1–32.9)
MCHC RBC AUTO-ENTMCNC: 30.9 G/DL (ref 31.4–35)
MCV RBC AUTO: 79 FL (ref 79.6–97.8)
METAMYELOCYTES NFR BLD MANUAL: 1 %
MONOCYTES # BLD: 0.9 K/UL (ref 0.1–1.3)
MONOCYTES NFR BLD MANUAL: 5 % (ref 3–9)
NEUTS BAND NFR BLD MANUAL: 5 % (ref 0–6)
NEUTS SEG # BLD: 15.9 K/UL (ref 1.7–8.2)
NEUTS SEG NFR BLD MANUAL: 83 % (ref 47–75)
NITRITE UR QL STRIP.AUTO: NEGATIVE
PH UR STRIP: 5.5 [PH] (ref 5–9)
PLATELET # BLD AUTO: 370 K/UL (ref 150–450)
PLATELET COMMENTS,PCOM: ADEQUATE
PMV BLD AUTO: 10.1 FL (ref 10.8–14.1)
POTASSIUM SERPL-SCNC: 4.1 MMOL/L (ref 3.5–5.1)
PROT UR STRIP-MCNC: ABNORMAL MG/DL
RBC # BLD AUTO: 3.48 M/UL (ref 4.05–5.25)
RBC MORPH BLD: ABNORMAL
SODIUM SERPL-SCNC: 139 MMOL/L (ref 136–145)
SP GR UR REFRACTOMETRY: 1.01 (ref 1–1.02)
UROBILINOGEN UR QL STRIP.AUTO: 0.2 EU/DL (ref 0.2–1)
WBC # BLD AUTO: 17.9 K/UL (ref 4.3–11.1)

## 2018-06-02 PROCEDURE — 77030027138 HC INCENT SPIROMETER -A

## 2018-06-02 PROCEDURE — 94760 N-INVAS EAR/PLS OXIMETRY 1: CPT

## 2018-06-02 PROCEDURE — 87040 BLOOD CULTURE FOR BACTERIA: CPT | Performed by: INTERNAL MEDICINE

## 2018-06-02 PROCEDURE — 86580 TB INTRADERMAL TEST: CPT | Performed by: INTERNAL MEDICINE

## 2018-06-02 PROCEDURE — 74011250637 HC RX REV CODE- 250/637: Performed by: INTERNAL MEDICINE

## 2018-06-02 PROCEDURE — 74011250636 HC RX REV CODE- 250/636: Performed by: HOSPITALIST

## 2018-06-02 PROCEDURE — 85025 COMPLETE CBC W/AUTO DIFF WBC: CPT | Performed by: INTERNAL MEDICINE

## 2018-06-02 PROCEDURE — 74011000302 HC RX REV CODE- 302: Performed by: INTERNAL MEDICINE

## 2018-06-02 PROCEDURE — 74011000258 HC RX REV CODE- 258: Performed by: INTERNAL MEDICINE

## 2018-06-02 PROCEDURE — 87086 URINE CULTURE/COLONY COUNT: CPT | Performed by: INTERNAL MEDICINE

## 2018-06-02 PROCEDURE — 65270000029 HC RM PRIVATE

## 2018-06-02 PROCEDURE — 80048 BASIC METABOLIC PNL TOTAL CA: CPT | Performed by: INTERNAL MEDICINE

## 2018-06-02 PROCEDURE — 74011250636 HC RX REV CODE- 250/636: Performed by: INTERNAL MEDICINE

## 2018-06-02 PROCEDURE — 36415 COLL VENOUS BLD VENIPUNCTURE: CPT | Performed by: INTERNAL MEDICINE

## 2018-06-02 PROCEDURE — 97530 THERAPEUTIC ACTIVITIES: CPT

## 2018-06-02 PROCEDURE — 81003 URINALYSIS AUTO W/O SCOPE: CPT | Performed by: INTERNAL MEDICINE

## 2018-06-02 PROCEDURE — 77010033678 HC OXYGEN DAILY

## 2018-06-02 RX ORDER — VANCOMYCIN 1.75 GRAM/500 ML IN 0.9 % SODIUM CHLORIDE INTRAVENOUS
1750 ONCE
Status: COMPLETED | OUTPATIENT
Start: 2018-06-02 | End: 2018-06-02

## 2018-06-02 RX ORDER — VANCOMYCIN/0.9 % SOD CHLORIDE 1.5G/250ML
1500 PLASTIC BAG, INJECTION (ML) INTRAVENOUS EVERY 24 HOURS
Status: DISCONTINUED | OUTPATIENT
Start: 2018-06-03 | End: 2018-06-05 | Stop reason: HOSPADM

## 2018-06-02 RX ADMIN — CLONAZEPAM 0.5 MG: 0.5 TABLET ORAL at 09:06

## 2018-06-02 RX ADMIN — CITALOPRAM HYDROBROMIDE 20 MG: 20 TABLET ORAL at 09:06

## 2018-06-02 RX ADMIN — BUPROPION HYDROCHLORIDE 150 MG: 150 TABLET, EXTENDED RELEASE ORAL at 09:06

## 2018-06-02 RX ADMIN — CLONAZEPAM 0.5 MG: 0.5 TABLET ORAL at 20:48

## 2018-06-02 RX ADMIN — POTASSIUM CHLORIDE 40 MEQ: 20 TABLET, EXTENDED RELEASE ORAL at 09:06

## 2018-06-02 RX ADMIN — FUROSEMIDE 40 MG: 40 TABLET ORAL at 09:06

## 2018-06-02 RX ADMIN — HYDRALAZINE HYDROCHLORIDE 10 MG: 20 INJECTION INTRAMUSCULAR; INTRAVENOUS at 00:15

## 2018-06-02 RX ADMIN — METOPROLOL SUCCINATE 50 MG: 50 TABLET, EXTENDED RELEASE ORAL at 09:06

## 2018-06-02 RX ADMIN — FLUTICASONE PROPIONATE 1 SPRAY: 50 SPRAY, METERED NASAL at 09:07

## 2018-06-02 RX ADMIN — DONEPEZIL HYDROCHLORIDE 10 MG: 5 TABLET, FILM COATED ORAL at 22:41

## 2018-06-02 RX ADMIN — TUBERCULIN PURIFIED PROTEIN DERIVATIVE 5 UNITS: 5 INJECTION, SOLUTION INTRADERMAL at 13:26

## 2018-06-02 RX ADMIN — Medication 5 ML: at 20:43

## 2018-06-02 RX ADMIN — CEFTRIAXONE 1 G: 1 INJECTION, POWDER, FOR SOLUTION INTRAMUSCULAR; INTRAVENOUS at 12:17

## 2018-06-02 RX ADMIN — VANCOMYCIN HYDROCHLORIDE 1750 MG: 10 INJECTION, POWDER, LYOPHILIZED, FOR SOLUTION INTRAVENOUS at 13:27

## 2018-06-02 RX ADMIN — ASPIRIN 81 MG 81 MG: 81 TABLET ORAL at 09:07

## 2018-06-02 RX ADMIN — PANTOPRAZOLE SODIUM 40 MG: 40 TABLET, DELAYED RELEASE ORAL at 09:07

## 2018-06-02 RX ADMIN — SENNOSIDES AND DOCUSATE SODIUM 1 TABLET: 8.6; 5 TABLET ORAL at 09:06

## 2018-06-02 RX ADMIN — ALLOPURINOL 100 MG: 100 TABLET ORAL at 09:06

## 2018-06-02 RX ADMIN — LISINOPRIL 40 MG: 20 TABLET ORAL at 09:06

## 2018-06-02 RX ADMIN — ALLOPURINOL 100 MG: 100 TABLET ORAL at 17:59

## 2018-06-02 RX ADMIN — LORATADINE 10 MG: 10 TABLET ORAL at 09:00

## 2018-06-02 NOTE — PROGRESS NOTES
Knee high graded compression stockings applied to bilat LE after measuring for appropriate size, by [facility], PCT. Bilateral LE elevated on pillows, heels off bed.

## 2018-06-02 NOTE — PROGRESS NOTES
Pharmacokinetic Consult to Pharmacist    Madeline Moseley is a 80 y.o. female being treated for sepsis of unknown etiology with ceftriaxone and vancomycin. Height: 5' 4\" (162.6 cm)  Weight: 93.5 kg (206 lb 3.2 oz)  Lab Results   Component Value Date/Time    BUN 21 06/02/2018 11:18 AM    Creatinine 1.27 (H) 06/02/2018 11:18 AM    WBC 17.9 (H) 06/02/2018 11:18 AM    Procalcitonin 7.5 05/30/2018 12:04 PM    Lactic Acid (POC) 1.8 05/30/2018 12:06 PM      Estimated Creatinine Clearance: 37.2 mL/min (based on Cr of 1.27). CULTURES:    All Micro Results       Procedure Component Value Units Date/Time      CULTURE, BLOOD [[id number]] Collected:  06/02/18 1118    Order Status:  Completed Specimen:  Blood from Blood Updated:  06/02/18 1125    CULTURE, URINE [[id number]]     Order Status:  Sent Specimen:  Urine from Clean catch              Day 1 of vancomycin. Goal trough is 15-20. We will initiate therapy with vancomycin 1750mg x 1 followed by 1500mg q24h. We will continue to follow the patient and adjust the dose as necessary per guidelines.        Thank you,  Reina Sorto, PharmD

## 2018-06-02 NOTE — PROGRESS NOTES
Awake. Incontinent of urine with care given and repositioning with pillows to support. No c/o. No distress noted.

## 2018-06-02 NOTE — PROGRESS NOTES
Progress Note    Patient: Kinsey Kumar MRN: [id number]  SSN: [id number]    YOB: 1934  Age: 80 y.o. Sex: female      Admit Date: 5/30/2018    LOS: 3 days     Subjective:     Patient had fever earlier today. No chills. No sore throat. No shortness of breath. No abdominal pain. No dysuria. Objective:     Vitals:    06/02/18 0015 06/02/18 0445 06/02/18 0649 06/02/18 0841   BP: 170/72 159/69  153/75   Pulse: 78 70  89   Resp:  18  18   Temp:  100 °F (37.8 °C) 98.6 °F (37 °C) 100.1 °F (37.8 °C)   SpO2:  91%  92%   Weight:       Height:            Intake and Output:  Current Shift:    Last three shifts: 05/31 1901 - 06/02 0700  In: -   Out: 50 [Urine:50]    Physical Exam:   General:                    The patient is an elderly female in no acute respiratory distress. Appears chronically ill. Lying flat in bed. Distended abdomen. Head:                                   Normocephalic. Bruises surrounding right eye and cheek. Eyes:                                   + palpebral pallor, no scleral icterus. ENT:                                    External auricular and nasal exam within normal limits.                                             NEUPBS membranes are moist.  Neck:                                   Supple, non-tender, no JVD. Lungs:                       Clear to auscultation bilaterally without wheezes or crackles.                                             No respiratory distress or accessory muscle use. Heart:                                  Regular rate and rhythm, without murmurs, rubs, or gallops. Abdomen:                  Soft, non-tender, very distended with normoactive bowel sounds. Positive for ventral hernia with palpation of a mass underneath it. Dilation of superficial abdominal wall veins. Genitourinary:           No tenderness over the bladder or bilateral CVAs. Extremities:               Without clubbing, cyanosis. Leg edema 1+. Some skin changes. Weeping lesion on the right shin. Right arm in a sling. Skin:                                    Normal color, texture, and turgor. Multiple bruises. Pulses:                      Radial and dorsalis pedis pulses present 2+ bilaterally.                                               Capillary refill <2s. Neurologic:                CN II-XII grossly intact and symmetrical.                                               Moving all four extremities well with no focal deficits. Psychiatric:                Pleasant demeanor, appropriate affect. Alert and oriented x 3      Lab/Data Review:    Recent Results (from the past 24 hour(s))   CBC WITH AUTOMATED DIFF    Collection Time: 06/01/18  1:20 PM   Result Value Ref Range    WBC 18.3 (H) 4.3 - 11.1 K/uL    RBC 3.49 (L) 4.05 - 5.25 M/uL    HGB 8.7 (L) 11.7 - 15.4 g/dL    HCT 27.6 (L) 35.8 - 46.3 %    MCV 79.1 (L) 79.6 - 97.8 FL    MCH 24.9 (L) 26.1 - 32.9 PG    MCHC 31.5 31.4 - 35.0 g/dL    RDW 19.0 (H) 11.9 - 14.6 %    PLATELET 494 764 - 002 K/uL    MPV 10.4 (L) 10.8 - 14.1 FL    NEUTROPHILS 92 (H) 47 - 75 %    LYMPHOCYTES 6 (L) 16 - 44 %    METAMYELOCYTES 2 %    NRBC 3.0  WBC    ABS. NEUTROPHILS 17.2 (H) 1.7 - 8.2 K/UL    ABS.  LYMPHOCYTES 1.1 0.5 - 4.6 K/UL    RBC COMMENTS SLIGHT  ANISOCYTOSIS + POIKILOCYTOSIS        RBC COMMENTS OCCASIONAL  TARGET CELLS        RBC COMMENTS MODERATE  HYPOCHROMIA        RBC COMMENTS OCCASIONAL  POLYCHROMASIA        PLATELET COMMENTS ADEQUATE      DF AUTOMATED     METABOLIC PANEL, BASIC    Collection Time: 06/01/18  1:20 PM   Result Value Ref Range    Sodium 140 136 - 145 mmol/L    Potassium 4.1 3.5 - 5.1 mmol/L    Chloride 104 98 - 107 mmol/L    CO2 26 21 - 32 mmol/L    Anion gap 10 7 - 16 mmol/L    Glucose 144 (H) 65 - 100 mg/dL    BUN 26 (H) 8 - 23 MG/DL    Creatinine 1.22 (H) 0.6 - 1.0 MG/DL    GFR est AA 54 (L) >60 ml/min/1.73m2    GFR est non-AA 45 (L) >60 ml/min/1.73m2    Calcium 8.5 8.3 - 10.4 MG/DL   CBC WITH AUTOMATED DIFF    Collection Time: 06/02/18 11:18 AM   Result Value Ref Range    WBC 17.9 (H) 4.3 - 11.1 K/uL    RBC 3.48 (L) 4.05 - 5.25 M/uL    HGB 8.5 (L) 11.7 - 15.4 g/dL    HCT 27.5 (L) 35.8 - 46.3 %    MCV 79.0 (L) 79.6 - 97.8 FL    MCH 24.4 (L) 26.1 - 32.9 PG    MCHC 30.9 (L) 31.4 - 35.0 g/dL    RDW 19.0 (H) 11.9 - 14.6 %    PLATELET 105 690 - 910 K/uL    MPV 10.1 (L) 10.8 - 14.1 FL    DF PENDING          Assessment:     Principal Problem:    Fatigue (5/30/2018)    Active Problems:    Essential hypertension, benign (8/19/2015)      Pure hypercholesterolemia (8/19/2015)      Ventral hernia without obstruction or gangrene (4/13/2018)      Anemia (5/30/2018)      Ovarian tumor (5/30/2018)      Fall at home, subsequent encounter (5/30/2018)        Plan:     Fatigue  Failure to thrive  Ovarian tumor with cyst  Continue gentle hydration. Fever  Cultures of blood and urine. No evidence of infiltrates in CXR. Will start empiric antibiotics. Anemia  Hb is holding up. No transfusion is indicated now. Physical therapy is consulted to help improve strength and ambulation. I have discussed the plan of care with patient and caretaker at bedside.      DVT prophylaxis : SCD    Signed By: Danii Alvarado MD     June 2, 2018

## 2018-06-02 NOTE — PROGRESS NOTES
Am assessment completed. Pt is alert to self. Verbalizes needs. abd very distended pt also has protruding umbilical hernia. Rt wrist fracture has a soft cast. Cap refill brisk and fingers are warm to touch and moveable. Denies pain. Currently. Appetite poor. Took medications crushed in applesauce. Safety measures in place. Continue to monitor.

## 2018-06-02 NOTE — PROGRESS NOTES
Continues to arouse easily, voiding incontinently without difficulty with care given and repositioning. Head of bed elevated. RUE elevated on pillows, bilat LE elevated on pillows, heels off bed. No c/o. No distress.

## 2018-06-02 NOTE — PROGRESS NOTES
Resting quietly, awake, resp even, unlab, skin warm, moist. Oriented to person, place, forgetful with time and situation. Assessment completed. Right facial, right hip and bilateral arm bruising noted. Abdom soft, distended with large umbilical hernia with bruising visible and reportedly a large mass under the umbilicus. Bilat LE with 2+ edema. SCDs off, sitter reports due to pt's skin tear, right shin.

## 2018-06-02 NOTE — PROGRESS NOTES
After discussing with Dr. Teresa Baeza, pt's order for SCDs to LE, pt unable to wear due to shin skin tear; orders received. Pt and sitter at side informed of alternative plan. Right shin tear, cleansed and covered with non-stick dsg, securing with foam dsg.

## 2018-06-02 NOTE — PROGRESS NOTES
Problem: Mobility Impaired (Adult and Pediatric)  Goal: *Acute Goals and Plan of Care (Insert Text)  1 WEEK GOALS :  (1.)Ms. Padilla will move from supine to sit and sit to supine  with MODERATE ASSIST within 7 treatment day(s). (2.)Ms. Padilla will transfer from sit<>stand with left UE support & moderate assist.   (3.)Ms. Padilla will perform fair sitting static balance for 1-2 min EOB with SBA. 4) pt will perform EOB wt-shifting multiple directions with CGA for 30 sec. 5) pt will perform AROM -AAROM to left UE & both LE's on cue with PT & caregiver to increase purposeful muscle activity. 6) pt will perform EOB scooting with moderate assist.        PHYSICAL THERAPY: Daily Note, Treatment Day: 1st, PM 6/2/2018  INPATIENT: Hospital Day: 4  [place] / Product Type: Medicare /      NAME/AGE/GENDER: Neelam Rodriguez is a 80 y.o. female   PRIMARY DIAGNOSIS: Anemia  Fatigue Fatigue Fatigue        ICD-10: Treatment Diagnosis:    · Generalized Muscle Weakness (M62.81)  · Other lack of cordination (R27.8)  · History of falling (Z91.81)   Precaution/Allergies:  Gold au 198 and Sulfa (sulfonamide antibiotics)      ASSESSMENT:     Ms. Tristen Alex was very weak, minimal purposeful muscle activity throughout. Pt worked on bed mobility, sitting balance, active assisted wt-shifting while sitting EOB. . Patient needed much encouragement to engage with therapy. Pt fatigued very quickly during activity. PT targeting core stability & increasing purposeful muscle activity throughout. Pt still is not manageable for caregivers in home environment. This section established at most recent assessment   PROBLEM LIST (Impairments causing functional limitations):  1. Decreased Strength  2. Decreased ADL/Functional Activities  3. Decreased Transfer Abilities  4. Decreased Balance  5. Decreased Activity Tolerance  6. Increased Fatigue  7. Increased Shortness of Breath  8.  Decreased Flexibility/Joint Mobility  9. Decreased [place] with Home Exercise Program   INTERVENTIONS PLANNED: (Benefits and precautions of physical therapy have been discussed with the patient.)  1. Balance Exercise  2. Bed Mobility  3. Neuromuscular Re-education/Strengthening  4. Therapeutic Activites  5. Therapeutic Exercise/Strengthening  6. Transfer Training     TREATMENT PLAN: Frequency/Duration: daily ( or as indicated ) for duration of hospital stay  Rehabilitation Potential For Stated Goals: Veronica Raphael REHABILITATION/EQUIPMENT: (at time of discharge pending progress): Due to the probability of continued deficits (see above) this patient will likely need continued skilled physical therapy after discharge. Equipment:    to be determined              HISTORY:   History of Present Injury/Illness (Reason for Referral): Madeline Moseley is a 80 y.o. female who was brought to ER by her caretaker for fatigue.      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 No surgery or definite treatment was planned. She also had recent fall at home and had right arm fracture, currently on splint. No surgery was planned due to her overall poor health condition.      Patient was feeling weak for the past few days. No energy. She denies fever. No chills. No shortness of breath. No chest pain.      Patient reports a lot of bruises all over her body and at the right half of her face due to recent fall. No headache now.      In ER, patient was found to have anemia. Her Hb dropped to 8+ from baseline of 12+ recently.      Due to multiple medical problems and possibility of acute blood loss anemia, hospitalist service was asked to see patient. Past Medical History/Comorbidities:   Ms. Reeder   has a past medical history of CHF (congestive heart failure) ([facility] [address] Coronary atherosclerosis of unspecified type of vessel, native or graft (8/19/2015);  Esophageal reflux (8/19/2015); Gastrointestinal disorder; Gout (8/19/2015); Hypertension; Other and unspecified hyperlipidemia (8/19/2015); Other ill-defined conditions(799.89); and Pure hypercholesterolemia (8/19/2015). Ms. Langley Last  has a past surgical history that includes hx cholecystectomy and hx heent. Social History/Living Environment:   Home Environment: Private residence  # Steps to Enter: 1  One/Two Story Residence: Two story, live on 1st floor  Living Alone: No  Support Systems:  (24/7 sitter service)  Patient Expects to be Discharged to[de-identified] Skilled nursing facility  Current DME Used/Available at Home: Walker, rollator  Prior Level of Function/Work/Activity:  Pt was functioning short distances with a Rolator & SBA, pt came supine <> sit & transferred out of a chair with moderate assist prior to this admission. Personal Factors: Other factors that influence how disability is experienced by the patient:  Current & PMH   Number of Personal Factors/Comorbidities that affect the Plan of Care: 3+: HIGH COMPLEXITY   EXAMINATION:   Most Recent Physical Functioning:   Gross Assessment:  AROM: Grossly decreased, non-functional (left UE & both LE)  Strength: Grossly decreased, non-functional (left UE & both LE)  Coordination: Grossly decreased, non-functional (left UE & both LE)                    Balance:  Sitting: Impaired;High guard; With support (manual)  Sitting - Static:  (fair to fair-)  Sitting - Dynamic:  (fair-)  Standing: Impaired;Pull to stand; With support (manual) Bed Mobility:  Supine to Sit: Maximum assistance  Sit to Supine: Maximum assistance;Assist x2  Scooting: Maximum assistance  Duration: 30 Minutes (extra time to work through activity noted)       Transfers:  Sit to Stand: Maximum assistance (left UE supported on backrest of chair, SBA 2nd person)  Stand to Sit: Maximum assistance  Gait: NA         Functional Mobility:         Transfers:  max        Bed Mobility:  Max - 2   Body Structures Involved:  1. Heart  2. Metabolic  3. Muscles Body Functions Affected:  1. Cardio  2. Movement Related  3. Metobolic/Endocrine Activities and Participation Affected:  1. General Tasks and Demands  2. Mobility   Number of elements that affect the Plan of Care: 4+: HIGH COMPLEXITY   CLINICAL PRESENTATION:   Presentation: Evolving clinical presentation with unstable and unpredictable characteristics: HIGH COMPLEXITY   CLINICAL DECISION MAKIN [facility] Mobility Inpatient Short Form  How much difficulty does the patient currently have. .. Unable A Lot A Little None   1. Turning over in bed (including adjusting bedclothes, sheets and blankets)? [] 1   [x] 2   [] 3   [] 4   2. Sitting down on and standing up from a chair with arms ( e.g., wheelchair, bedside commode, etc.)   [] 1   [x] 2   [] 3   [] 4   3. Moving from lying on back to sitting on the side of the bed? [] 1   [x] 2   [] 3   [] 4   How much help from another person does the patient currently need. .. Total A Lot A Little None   4. Moving to and from a bed to a chair (including a wheelchair)? [x] 1   [] 2   [] 3   [] 4   5. Need to walk in hospital room? [x] 1   [] 2   [] 3   [] 4   6. Climbing 3-5 steps with a railing? [x] 1   [] 2   [] 3   [] 4   © , Trustees of [address] [place] [address], under license to [employer]. All rights reserved      Score:  Initial: 9 Most Recent: X (Date: -- )    Interpretation of Tool:  Represents activities that are increasingly more difficult (i.e. Bed mobility, Transfers, Gait). Score 24 23 22-20 19-15 14-10 9-7 6     Modifier CH CI CJ CK CL CM CN      ?  Mobility - Walking and Moving Around:     - CURRENT STATUS: CM - 80%-99% impaired, limited or restricted    - GOAL STATUS: CL - 60%-79% impaired, limited or restricted    - D/C STATUS:  ---------------To be determined---------------  [place] / Product Type: Medicare / Medical Necessity:     · Patient is expected to demonstrate progress in strength, balance, coordination and functional technique to decrease assistance required with bed mobility, sitting balance & transfers. Reason for Services/Other Comments:  · Patient continues to require skilled intervention due to pt not being manageable for caregivers. Use of outcome tool(s) and clinical judgement create a POC that gives a: Difficult prediction of patient's progress: HIGH COMPLEXITY            TREATMENT:   (In addition to Assessment/Re-Assessment sessions the following treatments were rendered)   Pre-treatment Symptoms/Complaints:  Pt was agreeable with encouragement  Pain: Initial: visual scale  Pain Intensity 1: 0  Post Session:  0/10     Therapeutic Activity: (30 Minutes (extra time to work through activity noted)   ):  Therapeutic activities including rolling, supine<>sit (repeated), static & dynamic sitting balance (wt shifting EOB R<>L front,>back) repeated sit <>stand to improve mobility, strength, balance, coordination and dynamic movement of arm - left and leg - bilateral to improve functional core stability. Braces/Orthotics/Lines/Etc:   · IV  Treatment/Session Assessment:    · Response to Treatment:  fatigued quickly  · Interdisciplinary Collaboration:   o Registered Nurse  o Rehabilitation Attendant  · After treatment position/precautions:   o Supine in bed  o Bed/Chair-wheels locked  o Bed in low position  o Caregiver at bedside  o Call light within reach  o RN notified  o Family at bedside   · Compliance with Program/Exercises: Will assess as treatment progresses. · Recommendations/Intent for next treatment session: \"Next visit will focus on reduction in assistance provided\".   Total Treatment Duration:  PT Patient Time In/Time Out  Time In: 1630  Time Out: 0835 [facility] Nickie Romero, PT

## 2018-06-02 NOTE — PROGRESS NOTES
Care Management Interventions  Mode of Transport at Discharge: [facility]  Transition of Care Consult (CM Consult): SNF  Partner SNF: Yes  Discharge Durable Medical Equipment: No  Physical Therapy Consult: Yes  Current Support Network: Lives with Spouse  Confirm Follow Up Transport: Family  Plan discussed with Pt/Family/Caregiver: Yes  Freedom of Choice Offered: Yes  Discharge Location  Discharge Placement: Skilled nursing facility    CM spoke with Davon Mcwilliams from [place] [id number] who states that the patient's daughter has decided that her mother should go to short term rehab at [facility] prior to returning home. CM put in a referral to [facility] and requested that the patient's nurse order a PPD.

## 2018-06-02 NOTE — PROGRESS NOTES
Awake with request to get out of bed and sit up in recliner. Discussed with pt and sitter at side, that pt was unable to stand and walk with PT yesterday, according to daughter, Ro Hdz. For safety reasons, pt will need to be reassessed by PT for nursing staff to safely stand and walk pt to recliner. Nurse offered to reposition pt in bed as an alternative; Pt and/or sitter to think about and notify nurse if interested. Resting quietly, resp even, unlab, skin warm, dry. RUE cast remains intact, no N/V deficits.

## 2018-06-03 LAB
MM INDURATION POC: 0 MM (ref 0–5)
PPD POC: NEGATIVE NEGATIVE

## 2018-06-03 PROCEDURE — 74011250636 HC RX REV CODE- 250/636: Performed by: INTERNAL MEDICINE

## 2018-06-03 PROCEDURE — 77010033678 HC OXYGEN DAILY

## 2018-06-03 PROCEDURE — 74011250637 HC RX REV CODE- 250/637: Performed by: INTERNAL MEDICINE

## 2018-06-03 PROCEDURE — 65270000029 HC RM PRIVATE

## 2018-06-03 PROCEDURE — 94760 N-INVAS EAR/PLS OXIMETRY 1: CPT

## 2018-06-03 PROCEDURE — 74011000258 HC RX REV CODE- 258: Performed by: INTERNAL MEDICINE

## 2018-06-03 PROCEDURE — 97530 THERAPEUTIC ACTIVITIES: CPT

## 2018-06-03 RX ADMIN — LISINOPRIL 40 MG: 20 TABLET ORAL at 09:09

## 2018-06-03 RX ADMIN — METOPROLOL SUCCINATE 50 MG: 50 TABLET, EXTENDED RELEASE ORAL at 09:10

## 2018-06-03 RX ADMIN — DONEPEZIL HYDROCHLORIDE 10 MG: 5 TABLET, FILM COATED ORAL at 21:41

## 2018-06-03 RX ADMIN — BUPROPION HYDROCHLORIDE 150 MG: 150 TABLET, EXTENDED RELEASE ORAL at 09:09

## 2018-06-03 RX ADMIN — ALLOPURINOL 100 MG: 100 TABLET ORAL at 17:31

## 2018-06-03 RX ADMIN — LORATADINE 10 MG: 10 TABLET ORAL at 09:09

## 2018-06-03 RX ADMIN — FUROSEMIDE 40 MG: 40 TABLET ORAL at 09:09

## 2018-06-03 RX ADMIN — VANCOMYCIN HYDROCHLORIDE 1500 MG: 10 INJECTION, POWDER, LYOPHILIZED, FOR SOLUTION INTRAVENOUS at 14:40

## 2018-06-03 RX ADMIN — CLONAZEPAM 0.5 MG: 0.5 TABLET ORAL at 20:02

## 2018-06-03 RX ADMIN — ACETAMINOPHEN 650 MG: 325 TABLET ORAL at 04:11

## 2018-06-03 RX ADMIN — CLONAZEPAM 0.5 MG: 0.5 TABLET ORAL at 09:08

## 2018-06-03 RX ADMIN — ZOLPIDEM TARTRATE 5 MG: 5 TABLET ORAL at 01:41

## 2018-06-03 RX ADMIN — CITALOPRAM HYDROBROMIDE 20 MG: 20 TABLET ORAL at 09:10

## 2018-06-03 RX ADMIN — ALLOPURINOL 100 MG: 100 TABLET ORAL at 09:09

## 2018-06-03 RX ADMIN — PANTOPRAZOLE SODIUM 40 MG: 40 TABLET, DELAYED RELEASE ORAL at 09:09

## 2018-06-03 RX ADMIN — SENNOSIDES AND DOCUSATE SODIUM 1 TABLET: 8.6; 5 TABLET ORAL at 09:09

## 2018-06-03 RX ADMIN — ASPIRIN 81 MG 81 MG: 81 TABLET ORAL at 09:10

## 2018-06-03 RX ADMIN — FUROSEMIDE 40 MG: 40 TABLET ORAL at 16:40

## 2018-06-03 RX ADMIN — Medication 5 ML: at 21:45

## 2018-06-03 RX ADMIN — POTASSIUM CHLORIDE 40 MEQ: 20 TABLET, EXTENDED RELEASE ORAL at 09:09

## 2018-06-03 RX ADMIN — FLUTICASONE PROPIONATE 1 SPRAY: 50 SPRAY, METERED NASAL at 09:10

## 2018-06-03 RX ADMIN — ZOLPIDEM TARTRATE 5 MG: 5 TABLET ORAL at 21:44

## 2018-06-03 RX ADMIN — CEFTRIAXONE 1 G: 1 INJECTION, POWDER, FOR SOLUTION INTRAMUSCULAR; INTRAVENOUS at 14:20

## 2018-06-03 NOTE — PROGRESS NOTES
Problem: Mobility Impaired (Adult and Pediatric)  Goal: *Acute Goals and Plan of Care (Insert Text)  1 WEEK GOALS :  (1.)Ms. Padilla will move from supine to sit and sit to supine  with MODERATE ASSIST within 7 treatment day(s). (2.)Ms. Padilla will transfer from sit<>stand with left UE support & moderate assist.   (3.)Ms. Padilla will perform fair sitting static balance for 1-2 min EOB with SBA. 4) pt will perform EOB wt-shifting multiple directions with CGA for 30 sec. 5) pt will perform AROM -AAROM to left UE & both LE's on cue with PT & caregiver to increase purposeful muscle activity. 6) pt will perform EOB scooting with moderate assist.        PHYSICAL THERAPY: Daily Note, Treatment Day: 2nd, AM 6/3/2018  INPATIENT: Hospital Day: 5  [place] / Product Type: Medicare /      NAME/AGE/GENDER: Lisette Hansen is a 80 y.o. female   PRIMARY DIAGNOSIS: Anemia  Fatigue Fatigue Fatigue        ICD-10: Treatment Diagnosis:    · Generalized Muscle Weakness (M62.81)  · Other lack of cordination (R27.8)  · History of falling (Z91.81)   Precaution/Allergies:  Gold au 198 and Sulfa (sulfonamide antibiotics)      ASSESSMENT:     Ms. Autumn Oneal was less responsive to therapy, did open eyes to stimulus, (verbal & manual), pt did follow cues but inconsistently. Pt was more stable with sitting balance but constant cuing to stay engaged with all activity. More assist needed for bed mobility. This section established at most recent assessment   PROBLEM LIST (Impairments causing functional limitations):  1. Decreased Strength  2. Decreased ADL/Functional Activities  3. Decreased Transfer Abilities  4. Decreased Balance  5. Decreased Activity Tolerance  6. Increased Fatigue  7. Increased Shortness of Breath  8. Decreased Flexibility/Joint Mobility  9.  Decreased Benzie with Home Exercise Program   INTERVENTIONS PLANNED: (Benefits and precautions of physical therapy have been discussed with the patient.)  1. Balance Exercise  2. Bed Mobility  3. Neuromuscular Re-education/Strengthening  4. Therapeutic Activites  5. Therapeutic Exercise/Strengthening  6. Transfer Training     TREATMENT PLAN: Frequency/Duration: daily ( or as indicated ) for duration of hospital stay  Rehabilitation Potential For Stated Goals: Trupti Welch REHABILITATION/EQUIPMENT: (at time of discharge pending progress): Due to the probability of continued deficits (see above) this patient will likely need continued skilled physical therapy after discharge. Equipment:    to be determined              HISTORY:   History of Present Injury/Illness (Reason for Referral): Ramirez Wolf is a 80 y.o. female who was brought to ER by her caretaker for fatigue.      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 No surgery or definite treatment was planned. She also had recent fall at home and had right arm fracture, currently on splint. No surgery was planned due to her overall poor health condition.      Patient was feeling weak for the past few days. No energy. She denies fever. No chills. No shortness of breath. No chest pain.      Patient reports a lot of bruises all over her body and at the right half of her face due to recent fall. No headache now.      In ER, patient was found to have anemia. Her Hb dropped to 8+ from baseline of 12+ recently.      Due to multiple medical problems and possibility of acute blood loss anemia, hospitalist service was asked to see patient. Past Medical History/Comorbidities:   Ms. Raul Spann  has a past medical history of CHF (congestive heart failure) ([facility] [address] Coronary atherosclerosis of unspecified type of vessel, native or graft (8/19/2015); Esophageal reflux (8/19/2015); Gastrointestinal disorder; Gout (8/19/2015); Hypertension; Other and unspecified hyperlipidemia (8/19/2015);  Other ill-defined conditions(799.89); and Pure hypercholesterolemia (8/19/2015). Ms. Tonny Finnegan  has a past surgical history that includes hx cholecystectomy and hx heent. Social History/Living Environment:   Home Environment: Private residence  # Steps to Enter: 1  One/Two Story Residence: Two story, live on 1st floor  Living Alone: No  Support Systems:  (24/7 sitter service)  Patient Expects to be Discharged to[de-identified] Skilled nursing facility  Current DME Used/Available at Home: Walker, rollator  Prior Level of Function/Work/Activity:  Pt was functioning short distances with a Rolator & SBA, pt came supine <> sit & transferred out of a chair with moderate assist prior to this admission. Personal Factors: Other factors that influence how disability is experienced by the patient:  Current & PMH   Number of Personal Factors/Comorbidities that affect the Plan of Care: 3+: HIGH COMPLEXITY   EXAMINATION:   Most Recent Physical Functioning:   Gross Assessment:  AROM: Grossly decreased, non-functional (left UE & both LE)  Strength: Grossly decreased, non-functional (left UE & both LE)  Coordination: Grossly decreased, non-functional (left UE & both LE)                    Balance:  Sitting: Intact; High guard; Without support  Sitting - Static:  (fair to fair-)  Sitting - Dynamic:  (fair)  Standing: Impaired;Pull to stand; With support (manual)  Standing - Static:  (poor with manual support)  Standing - Dynamic :  (poor with support) Bed Mobility:  Supine to Sit: Maximum assistance;Assist x2  Sit to Supine: Total assistance;Assist x2  Scooting: Total assistance  Duration: 30 Minutes       Transfers:  Sit to Stand: Maximum assistance (2nd person for safety)  Stand to Sit: Maximum assistance  Gait: NA         Functional Mobility:         Transfers:  max        Bed Mobility:  Max - 2 to TD   Body Structures Involved:  1. Heart  2. Metabolic  3. Muscles Body Functions Affected:  1. Cardio  2. Movement Related  3.  Metobolic/Endocrine Activities and Participation Affected:  1. General Tasks and Demands  2. Mobility   Number of elements that affect the Plan of Care: 4+: HIGH COMPLEXITY   CLINICAL PRESENTATION:   Presentation: Evolving clinical presentation with unstable and unpredictable characteristics: HIGH COMPLEXITY   CLINICAL DECISION MAKIN [facility] Inpatient Short Form  How much difficulty does the patient currently have. .. Unable A Lot A Little None   1. Turning over in bed (including adjusting bedclothes, sheets and blankets)? [] 1   [x] 2   [] 3   [] 4   2. Sitting down on and standing up from a chair with arms ( e.g., wheelchair, bedside commode, etc.)   [] 1   [x] 2   [] 3   [] 4   3. Moving from lying on back to sitting on the side of the bed? [] 1   [x] 2   [] 3   [] 4   How much help from another person does the patient currently need. .. Total A Lot A Little None   4. Moving to and from a bed to a chair (including a wheelchair)? [x] 1   [] 2   [] 3   [] 4   5. Need to walk in hospital room? [x] 1   [] 2   [] 3   [] 4   6. Climbing 3-5 steps with a railing? [x] 1   [] 2   [] 3   [] 4   © , Trustees of [address] [place] [address] [address] [address], under license to [employer]. All rights reserved      Score:  Initial: 9 Most Recent: X (Date: -- )    Interpretation of Tool:  Represents activities that are increasingly more difficult (i.e. Bed mobility, Transfers, Gait). Score 24 23 22-20 19-15 14-10 9-7 6     Modifier CH CI CJ CK CL CM CN      ?  Mobility - Walking and Moving Around:     - CURRENT STATUS: CM - 80%-99% impaired, limited or restricted    - GOAL STATUS: CL - 60%-79% impaired, limited or restricted    - D/C STATUS:  ---------------To be determined---------------  [place] / Product Type: Medicare /      Medical Necessity:     · Patient is expected to demonstrate progress in strength, balance, coordination and functional technique to decrease assistance required with bed mobility, sitting balance & transfers. Reason for Services/Other Comments:  · Patient continues to require skilled intervention due to pt not being manageable for caregivers. Use of outcome tool(s) and clinical judgement create a POC that gives a: Difficult prediction of patient's progress: HIGH COMPLEXITY            TREATMENT:   (In addition to Assessment/Re-Assessment sessions the following treatments were rendered)   Pre-treatment Symptoms/Complaints: pt very flat but agreeable  Pain: Initial: visual scale  Pain Intensity 1: 0  Post Session:  0/10     Therapeutic Activity: (30 Minutes   ):  Therapeutic activities including rolling, supine<>sit, static & dynamic sitting balance (wt shifting EOB R<>L front,>back) repeated sit <>stand, when standing active assisted wt-shift R<>L, pt also performed AAROM to left arm & both LE's to stimulate purposeful movement to improve mobility, strength, balance, coordination and dynamic movement of arm - left and leg - bilateral to improve functional core stability. Braces/Orthotics/Lines/Etc:   · IV  Treatment/Session Assessment:    · Response to Treatment:  Much less engaged with PT  · Interdisciplinary Collaboration:   o Registered Nurse  o Rehabilitation Attendant  o Certified Nursing Assistant/Patient Care Technician  · After treatment position/precautions:   o Supine in bed  o Bed/Chair-wheels locked  o Bed in low position  o Caregiver at bedside  o Call light within reach  o RN notified  o Family at bedside   · Compliance with Program/Exercises: Will assess as treatment progresses. · Recommendations/Intent for next treatment session: \"Next visit will focus on reduction in assistance provided\".   Total Treatment Duration:  PT Patient Time In/Time Out  Time In: 0825  Time Out: 3368    Marcia Narayan PT

## 2018-06-03 NOTE — PROGRESS NOTES
Resting quietly, resp even, unlab with O2 intact. Skin warm, dry. Ecchymotic areas to face, right shoulder, right hip, enlarged umbilicus, left knee. Assessment completed. No c/o. No distress noted. RUE cast/dsg clean, dry, intact, fingers swollen with movement and sensation with no N/V deficits noted, elevated on pillow.

## 2018-06-03 NOTE — PROGRESS NOTES
Resting quietly, resp even, unlab. Eyes closed with relaxed facial expression. Pillows placed under legs, heels off bed. Head of bed lowered. Sitter at side reports she checked brief within the hour, brief clean and dry. Sitter also reports patient nathaniel to sleep after Ambien and then was calling out to her . No distress noted.

## 2018-06-03 NOTE — PROGRESS NOTES
Problem: Falls - Risk of  Goal: *Absence of Falls  Document Richard Fall Risk and appropriate interventions in the flowsheet. Fall Risk Interventions:  Mobility Interventions: Bed/chair exit alarm, Communicate number of staff needed for ambulation/transfer, PT Consult for mobility concerns    Mentation Interventions: Adequate sleep, hydration, pain control, Bed/chair exit alarm, Family/sitter at bedside, Reorient patient, Room close to nurse's station, Toileting rounds, Update white board    Medication Interventions: Bed/chair exit alarm, Patient to call before getting OOB    Elimination Interventions: Bed/chair exit alarm, Call light in reach    History of Falls Interventions: Bed/chair exit alarm, Room close to nurse's station        Problem: Pressure Injury - Risk of  Goal: *Prevention of pressure injury  Document Perico Scale and appropriate interventions in the flowsheet. Pressure Injury Interventions:  Sensory Interventions: Assess changes in LOC, Float heels, Turn and reposition approx. every two hours (pillows and wedges if needed)    Moisture Interventions: Absorbent underpads    Activity Interventions: Assess need for specialty bed, PT/OT evaluation    Mobility Interventions: Assess need for specialty bed, Float heels, HOB 30 degrees or less, PT/OT evaluation, Turn and reposition approx.  every two hours(pillow and wedges)    Nutrition Interventions: Document food/fluid/supplement intake    Friction and Shear Interventions: Apply protective barrier, creams and emollients, Foam dressings/transparent film/skin sealants, HOB 30 degrees or less

## 2018-06-03 NOTE — PROGRESS NOTES
Prevalon boots place on bilat feet of pt. Pt in bed resting quietly with caretaker at bedside. No acute signs of distress noted.

## 2018-06-03 NOTE — PROGRESS NOTES
Shift assessment done via doc flow sheet. No signs of distress noted. RR even and unlabored. HR regular. Abdomen distended, hypo active bowel sounds. Bilateral lower edema noted. IV site without complications. Personal aid at bedside. Bed LL. Call light in reach. All needs met at this time. Staff will continue to monitor with hourly rounds.

## 2018-06-03 NOTE — PROGRESS NOTES
Awoke easily. Tylenol 650 mg given crushed in pudding for temp 100, axillary. Incontinent of urine with care given and repositioning with pillows to support. . Extra protective cream applied to reddened jose area, skin intact, blanchable. No c/o. No distress.

## 2018-06-03 NOTE — PROGRESS NOTES
Incontinent of urine with care given and repositioning with pillows to support. Ambreen area and buttocks reddish purple in color, blanching well, skin intact. Allevyn dsg changed. Bedtime snack of softened elsie crackers mixed with peanut butter and milk offered and fed to pt, consuming few bites. No c/o. No distress noted.

## 2018-06-03 NOTE — PROGRESS NOTES
Resting quietly, resp even, unlab with no c/o. O2 applied at 1L N/C, following O2 sat reportedly 91% on room air. Skin warm, dry. No c/o. No distress noted.

## 2018-06-03 NOTE — PROGRESS NOTES
Pt requesting to get out of bed, brief assessed, clean and dry, bedpan given for possible toileting with no void, by Derek Montenegro RN. Repositioned with pillows to support. Pt reminded she is unable to get out of bed due to weakness and limited movement with Physical Therapy. No further needs. Sitter at side.

## 2018-06-03 NOTE — PROGRESS NOTES
Patient has a  through [address] [facility] (not [place], but did not introduce herself) who came by and tried to sit at the 's desk. [facility] Nurse asked CM why the patient spiked a fever and if she had an infection. CM advised that she was a  and would not be able to answer that question for her and requested she speak with the patient's Nurse. CM from [facility] then asked the CM to read the doctor's notes from the patient's chart to her and the CM advised that she could not and would not do that as the CM from [facility] is not a  employee. CM from [facility] appeared upset/frustrated, and walked away abruptly.       Rocío Wilhelm, [address] [facility]    [address] [facility]  [phone]

## 2018-06-03 NOTE — PROGRESS NOTES
Continues to rest quietly, resp even, unlab with O2 intact. Sitter at side reports she removed pt's leg wraps when pt c/o legs feeling too hot. Right shin dsg clean, dry, intact. No distress noted.

## 2018-06-03 NOTE — PROGRESS NOTES
Resting quietly, resp even, unlab with O2 intact during bedside report given to Debarah Leyden, RN and Vanesa Johnson RN. No c/o. No distress noted. Sitter at side.

## 2018-06-03 NOTE — PROGRESS NOTES
Progress Note    Patient: Brayan Couch MRN: [id number]  SSN: [id number]    YOB: 1934  Age: 80 y.o. Sex: female      Admit Date: 5/30/2018    LOS: 4 days     Subjective:     Patient does not eat solid food much. She drinks Ensure. She has no fever since yesterday. No abdominal pain. However still with abdominal discomfort. Objective:     Vitals:    06/03/18 0052 06/03/18 0400 06/03/18 0750 06/03/18 1212   BP: 166/82 162/70 132/67 162/76   Pulse: 80 88 71 65   Resp: 22 24 22 20   Temp: 98.3 °F (36.8 °C) 100 °F (37.8 °C) 98 °F (36.7 °C) 97.9 °F (36.6 °C)   SpO2: 94% 94% 93% 94%   Weight:       Height:            Intake and Output:  Current Shift: 06/03 0701 - 06/03 1900  In: -   Out: 250 [Urine:250]  Last three shifts: 06/01 1901 - 06/03 0700  In: 610 [I.V.:610]  Out: -     Physical Exam:   General:                    The patient is an elderly female in no acute respiratory distress. Appears chronically ill. Lying flat in bed. Distended abdomen. Lethargic. Head:                                   Normocephalic. Bruises surrounding right eye and cheek. Eyes:                                   + palpebral pallor, no scleral icterus. ENT:                                    External auricular and nasal exam within normal limits.                                             HHGVZR membranes are moist.  Neck:                                   Supple, non-tender, no JVD. Lungs:                       Clear to auscultation bilaterally without wheezes or crackles.                                             No respiratory distress or accessory muscle use. Heart:                                  Regular rate and rhythm, without murmurs, rubs, or gallops. Abdomen:                  Soft, non-tender, very distended with normoactive bowel sounds. Positive for ventral hernia with palpation of a mass underneath it. Dilation of superficial abdominal wall veins at the ventral hernia area. Genitourinary:           No tenderness over the bladder or bilateral CVAs. Extremities:               Without clubbing, cyanosis. Leg edema 1+. Some skin changes. Weeping lesion on the right shin. Right arm in a sling. Skin:                                    Normal color, texture, and turgor. Multiple bruises. Pulses:                      Radial and dorsalis pedis pulses present 2+ bilaterally.                                               Capillary refill <2s. Neurologic:                CN II-XII grossly intact and symmetrical.                                               Moving all four extremities well with no focal deficits. Psychiatric:                Pleasant demeanor, appropriate affect. Alert and oriented x 3      Lab/Data Review:    Recent Results (from the past 24 hour(s))   CULTURE, URINE    Collection Time: 06/02/18  5:21 PM   Result Value Ref Range    Special Requests: NO SPECIAL REQUESTS      Culture result:        NO GROWTH AFTER SHORT PERIOD OF INCUBATION. FURTHER RESULTS TO FOLLOW AFTER OVERNIGHT INCUBATION. URINALYSIS W/ RFLX MICROSCOPIC    Collection Time: 06/02/18  5:21 PM   Result Value Ref Range    Color YELLOW      Appearance CLOUDY      Specific gravity 1.015 1.001 - 1.023      pH (UA) 5.5 5.0 - 9.0      Protein TRACE (A) NEG mg/dL    Glucose NEGATIVE  mg/dL    Ketone NEGATIVE  NEG mg/dL    Bilirubin NEGATIVE  NEG      Blood NEGATIVE  NEG      Urobilinogen 0.2 0.2 - 1.0 EU/dL    Nitrites NEGATIVE  NEG      Leukocyte Esterase NEGATIVE  NEG      Epithelial cells 0-3 0 /hpf    Bacteria 0 0 /hpf     All Micro Results     Procedure Component Value Units Date/Time    CULTURE, URINE [[id number]] Collected:  06/02/18 1721    Order Status:  Completed Specimen:  Urine from Clean catch Updated:  06/03/18 1037     Special Requests: NO SPECIAL REQUESTS        Culture result:         NO GROWTH AFTER SHORT PERIOD OF INCUBATION. FURTHER RESULTS TO FOLLOW AFTER OVERNIGHT INCUBATION.     CULTURE, BLOOD [[id number]] Collected:  06/02/18 1118    Order Status:  Completed Specimen:  Blood from Blood Updated:  06/03/18 0740     Special Requests: --        LEFT  HAND       Culture result: NO GROWTH AFTER 20 HOURS           6/2/2018 11:41 AM - [facility] [place]   Component Results   Component Value Flag Ref Range Units Status   Sodium 139  136 - 145 mmol/L Final   Potassium 4.1  3.5 - 5.1 mmol/L Final   Chloride 103  98 - 107 mmol/L Final   CO2 25  21 - 32 mmol/L Final   Anion gap 11  7 - 16 mmol/L Final   Glucose 147 (H) 65 - 100 mg/dL Final   Comment:   47 - 60 mg/dl Consistent with, but not fully diagnostic of hypoglycemia. 101 - 125 mg/dl Impaired fasting glucose/consistent with pre-diabetes mellitus   > 126 mg/dl Fasting glucose consistent with overt diabetes mellitus      BUN 21  8 - 23 MG/DL Final   Creatinine 1.27 (H) 0.6 - 1.0 MG/DL Final   GFR est AA 52 (L) >60 ml/min/1.73m2 Final   GFR est non-AA 43 (L) >60 ml/min/1.73m2 Final   Calcium 8.4  8.3 - 10.4 MG/DL Final     6/2/2018 12:05 PM - [facility] [place]   Component Results   Component Value Flag Ref Range Units Status   WBC 17.9 (H) 4.3 - 11.1 K/uL Final   RBC 3.48 (L) 4.05 - 5.25 M/uL Final   HGB 8.5 (L) 11.7 - 15.4 g/dL Final   HCT 27.5 (L) 35.8 - 46.3 % Final   MCV 79.0 (L) 79.6 - 97.8 FL Final   MCH 24.4 (L) 26.1 - 32.9 PG Final   MCHC 30.9 (L) 31.4 - 35.0 g/dL Final   RDW 19.0 (H) 11.9 - 14.6 % Final   PLATELET 212  244 - 999 K/uL Final   MPV 10.1 (L) 10.8 - 14.1 FL Final   NEUTROPHILS 83 (H) 47 - 75 % Final   BAND NEUTROPHILS 5  0 - 6 % Final   LYMPHOCYTES 4 (L) 16 - 44 % Final   MONOCYTES 5  3 - 9 % Final   EOSINOPHILS 2  1 - 8 % Final   METAMYELOCYTES 1   % Final   ABS. NEUTROPHILS 15.9 (H) 1.7 - 8.2 K/UL Final   ABS. LYMPHOCYTES 0.7  0.5 - 4.6 K/UL Final   ABS. MONOCYTES 0.9  0.1 - 1.3 K/UL Final   ABS.  EOSINOPHILS 0.4  0.0 - 0.8 K/UL Final         Assessment:     Principal Problem:    Fatigue (5/30/2018)    Active Problems: Essential hypertension, benign (8/19/2015)      Pure hypercholesterolemia (8/19/2015)      Ventral hernia without obstruction or gangrene (4/13/2018)      Anemia (5/30/2018)      Ovarian tumor (5/30/2018)      Fall at home, subsequent encounter (5/30/2018)        Plan:     Fatigue  Failure to thrive  Ovarian tumor with cyst  IV fluid has been stopped. Encourage oral intake. May not be able to go home for out-patient abdominal tapping by Tuesday. Dr. Ceferino Little will likely see patient tomorrow. Fever  Cultures of blood and urine are negative so far. No evidence of infiltrates in CXR. continue empiric antibiotics. Afebrile now. Anemia  Hb is holding up. No transfusion is indicated now. Physical therapy is consulted to help improve strength and ambulation. I have discussed the plan of care with patient and caretaker at bedside.      DVT prophylaxis : SCD    Signed By: Gale Quinonez MD     Elsy 3, 2018

## 2018-06-03 NOTE — PROGRESS NOTES
Shift assessment complete. Pt in bed resting quietly. No signs of distress. Respirations even and unlabored. HR regular. Abdomen distended, hypoactive bowel sounds. Bilateral lower extremity edema. IV capped and patent. Private aid at bedside. Bed low and locked, call light within reach, side rails x3. No needs stated at this time. Will continue to monitor.

## 2018-06-04 LAB
MM INDURATION POC: NORMAL MM (ref 0–5)
PPD POC: NEGATIVE NEGATIVE

## 2018-06-04 PROCEDURE — 77010033678 HC OXYGEN DAILY

## 2018-06-04 PROCEDURE — 74011250637 HC RX REV CODE- 250/637: Performed by: INTERNAL MEDICINE

## 2018-06-04 PROCEDURE — 74011250636 HC RX REV CODE- 250/636: Performed by: INTERNAL MEDICINE

## 2018-06-04 PROCEDURE — 94760 N-INVAS EAR/PLS OXIMETRY 1: CPT

## 2018-06-04 PROCEDURE — 77030019605

## 2018-06-04 PROCEDURE — 77030020256 HC SOL INJ NACL 0.9%  500ML

## 2018-06-04 PROCEDURE — 74011000258 HC RX REV CODE- 258: Performed by: INTERNAL MEDICINE

## 2018-06-04 PROCEDURE — 97530 THERAPEUTIC ACTIVITIES: CPT

## 2018-06-04 PROCEDURE — 65270000029 HC RM PRIVATE

## 2018-06-04 RX ORDER — LORAZEPAM 0.5 MG/1
0.5 TABLET ORAL
Status: DISCONTINUED | OUTPATIENT
Start: 2018-06-04 | End: 2018-06-05 | Stop reason: HOSPADM

## 2018-06-04 RX ADMIN — SENNOSIDES AND DOCUSATE SODIUM 1 TABLET: 8.6; 5 TABLET ORAL at 08:56

## 2018-06-04 RX ADMIN — FUROSEMIDE 40 MG: 40 TABLET ORAL at 08:56

## 2018-06-04 RX ADMIN — DONEPEZIL HYDROCHLORIDE 10 MG: 5 TABLET, FILM COATED ORAL at 21:26

## 2018-06-04 RX ADMIN — ACETAMINOPHEN 650 MG: 325 TABLET ORAL at 15:21

## 2018-06-04 RX ADMIN — METOPROLOL SUCCINATE 50 MG: 50 TABLET, EXTENDED RELEASE ORAL at 08:56

## 2018-06-04 RX ADMIN — Medication 10 ML: at 21:36

## 2018-06-04 RX ADMIN — CLONAZEPAM 0.5 MG: 0.5 TABLET ORAL at 20:31

## 2018-06-04 RX ADMIN — Medication 10 ML: at 06:00

## 2018-06-04 RX ADMIN — CITALOPRAM HYDROBROMIDE 20 MG: 20 TABLET ORAL at 08:56

## 2018-06-04 RX ADMIN — ASPIRIN 81 MG 81 MG: 81 TABLET ORAL at 08:57

## 2018-06-04 RX ADMIN — LORATADINE 10 MG: 10 TABLET ORAL at 08:57

## 2018-06-04 RX ADMIN — POTASSIUM CHLORIDE 40 MEQ: 20 TABLET, EXTENDED RELEASE ORAL at 08:56

## 2018-06-04 RX ADMIN — ALLOPURINOL 100 MG: 100 TABLET ORAL at 17:00

## 2018-06-04 RX ADMIN — FLUTICASONE PROPIONATE 1 SPRAY: 50 SPRAY, METERED NASAL at 08:57

## 2018-06-04 RX ADMIN — LORAZEPAM 0.5 MG: 0.5 TABLET ORAL at 16:54

## 2018-06-04 RX ADMIN — LISINOPRIL 40 MG: 20 TABLET ORAL at 08:56

## 2018-06-04 RX ADMIN — Medication 5 ML: at 14:43

## 2018-06-04 RX ADMIN — ZOLPIDEM TARTRATE 5 MG: 5 TABLET ORAL at 21:33

## 2018-06-04 RX ADMIN — PANTOPRAZOLE SODIUM 40 MG: 40 TABLET, DELAYED RELEASE ORAL at 08:56

## 2018-06-04 RX ADMIN — ALLOPURINOL 100 MG: 100 TABLET ORAL at 08:56

## 2018-06-04 RX ADMIN — FUROSEMIDE 40 MG: 40 TABLET ORAL at 16:54

## 2018-06-04 RX ADMIN — CLONAZEPAM 0.5 MG: 0.5 TABLET ORAL at 08:56

## 2018-06-04 RX ADMIN — CEFTRIAXONE 1 G: 1 INJECTION, POWDER, FOR SOLUTION INTRAMUSCULAR; INTRAVENOUS at 11:10

## 2018-06-04 RX ADMIN — VANCOMYCIN HYDROCHLORIDE 1500 MG: 10 INJECTION, POWDER, LYOPHILIZED, FOR SOLUTION INTRAVENOUS at 14:43

## 2018-06-04 RX ADMIN — BUPROPION HYDROCHLORIDE 150 MG: 150 TABLET, EXTENDED RELEASE ORAL at 08:56

## 2018-06-04 NOTE — PROGRESS NOTES
Problem: Interdisciplinary Rounds  Goal: Interdisciplinary Rounds  Interdisciplinary team rounds were held 6/4/2018 with the following team members:Care Management, Nursing, Nutrition, Pharmacy, Physical Therapy and Physician and the patient, spouse, friend and sitter. Plan of care discussed. See clinical pathway and/or care plan for interventions and desired outcomes.

## 2018-06-04 NOTE — PROGRESS NOTES
Shift assessment complete. Pt resting in bed quietly. Aid at bedside. Alert and oriented to person, and place, disoriented to time. Respirations diminished, but no signs of respiratory distress. 02 saturation 91%. HR regular. Abdomen distended, protruding umbilicus tumor is warm and hard. Hypoactive bowel sounds heard in all 4 quadrants. Bruising to face and right hip.  +2 edema in bilateral lower extremities. Prevalon boots on pt bilaterally. IV patent and capped. Bed low and locked, call light within reach, side rails x3, personal aid at bedside. Will continue to monitor.

## 2018-06-04 NOTE — PROGRESS NOTES
Spoke with Oncology MD regarding scheduled drainage at IR Tuesday morning. Notified MD of elevation in fevers. Plan remains for IR 8am Tuesday, June 5.

## 2018-06-04 NOTE — PROGRESS NOTES
Vancomycin Consult (Day 3)    MD ordering: Yoel ROY following? no  Indication: sepsis  DOT:  unknown days  Goal level(s): 15 - 20    Allergies as of 2018 - Review Complete 2018   Allergen Reaction Noted    Gold au 198 Unknown (comments) 2015    Sulfa (sulfonamide antibiotics) Unknown (comments) 2015     Current Antimicrobial Therapy (168h ago through future)      Ordered     Start Stop      18 1210  vancomycin (VANCOCIN) 1500 mg in  ml infusion  1,500 mg,   IntraVENous,   EVERY 24 HOURS      18 1300 --    18 1029  cefTRIAXone (ROCEPHIN) 1 g in 0.9% sodium chloride (MBP/ADV) 50 mL  1 g,   IntraVENous,   EVERY 24 HOURS      18 1100 --            All Micro Results       Procedure Component Value Units Date/Time      CULTURE, URINE [[id number]] Collected:  18 1721    Order Status:  Completed Specimen:  Urine from Clean catch Updated:  18 0808     Special Requests: NO SPECIAL REQUESTS        Culture result:         <10,000 COLONIES/mL MIXED SKIN CHANNING ISOLATED    CULTURE, BLOOD [[id number]] Collected:  18 1118    Order Status:  Completed Specimen:  Blood from Blood Updated:  18 0723     Special Requests: --        LEFT  HAND       Culture result: NO GROWTH 2 DAYS               Temp (24hrs), Av.9 °F (36.6 °C), Min:96.8 °F (36 °C), Max:98.5 °F (36.9 °C)    CrCl ~ 38 ml/min  Dosing weight: 96 kg  80 y.o. Date:  Dose/Freq Admin Times Level/Time:   18 Vanc 1750 mg IV x 1 dose 1327    6/3/18 Vanc 1500 mg IV q24h 1440    18 Vanc 1500 mg IV q24h 1443    18 Vanc 1500 mg IV q24h Due at 1500 Trough due at 1400     Recent Labs      18   1118   BUN  21   CREA  1.27*   WBC  17.9*     Estimated Creatinine Clearance: 37.7 mL/min (based on Cr of 1.27). A/P:    Continue current dose of Vancomycin 1500 mg IV every 24 hours. Ordered trough level for tomorrow at 1400, prior to 1500 dose. Will continue to follow.     Thank you,    Samantha Williamson, PharmD

## 2018-06-04 NOTE — PROGRESS NOTES
Dana RN case manager with [address] consultant ( [phone]) came by to check on pt. Still waiting to receive bed offer from [address] [place] [address]. GENEVIEVE sent [address] [place] [address] a message via CC.

## 2018-06-04 NOTE — PROGRESS NOTES
Pt complaining of anxiety. Dr. Duane Antunez notified, orders to be placed by MD. Will continue to monitor.

## 2018-06-04 NOTE — PROGRESS NOTES
Pt complains of right arm pain 6/10. PRN tylenol 650 mg given PO. Bed low and locked, call light within reach, aid at bedside. Will continue to monitor.

## 2018-06-04 NOTE — PROGRESS NOTES
[facility]  [id number]  [id number]  06/04/18    Patient seen and examined. No acute events. Visit Vitals    /76 (BP 1 Location: Left arm, BP Patient Position: At rest;Supine)    Pulse 79    Temp 98.1 °F (36.7 °C)    Resp 16    Ht 5' 4\" (1.626 m)    Wt 211 lb 9.6 oz (96 kg)    SpO2 96%    BMI 36.32 kg/m2           Intake/Output Summary (Last 24 hours) at 06/04/18 1722  Last data filed at 06/04/18 1103   Gross per 24 hour   Intake                0 ml   Output              400 ml   Net             -400 ml   AFVSS  Gen: WDWN, NAD  Abdomen: NTTP, + BS  Ext: - homans. Recent Results (from the past 24 hour(s))   PLEASE READ & DOCUMENT PPD TEST IN 48 HRS    Collection Time: 06/04/18  1:15 PM   Result Value Ref Range    PPD Negative Negative    mm Induration 0.0mm mm     Pelvic mass with umbilical hernia, s/p fall with fracture  Plan for IR drainage tomorrow  Encouraged PO intake.

## 2018-06-04 NOTE — PROGRESS NOTES
Problem: Mobility Impaired (Adult and Pediatric)  Goal: *Acute Goals and Plan of Care (Insert Text)  1 WEEK GOALS :  (1.)Ms. Padilla will move from supine to sit and sit to supine  with MODERATE ASSIST within 7 treatment day(s). (2.)Ms. Padilla will transfer from sit<>stand with left UE support & moderate assist.   (3.)Ms. Padilla will perform fair sitting static balance for 1-2 min EOB with SBA. 4) pt will perform EOB wt-shifting multiple directions with CGA for 30 sec. 5) pt will perform AROM -AAROM to left UE & both LE's on cue with PT & caregiver to increase purposeful muscle activity. 6) pt will perform EOB scooting with moderate assist.        PHYSICAL THERAPY: Daily Note, Treatment Day: 2nd, AM 6/4/2018  INPATIENT: Hospital Day: 6  [place] / [place] / Product Type: Medicare /      NAME/AGE/GENDER: Nickolas Cali is a 80 y.o. female   PRIMARY DIAGNOSIS: Anemia  Fatigue Fatigue Fatigue        ICD-10: Treatment Diagnosis:    · Generalized Muscle Weakness (M62.81)  · Other lack of cordination (R27.8)  · History of falling (Z91.81)   Precaution/Allergies:  Gold au 198 and Sulfa (sulfonamide antibiotics)      ASSESSMENT:     Ms. Grzegorz Pickens is supine upon arrival.  Pt was Total A x 2 to roll from side to side to get on the bed pain. She is Total A x 2 going from supine to sit on EOB. Sitting on EOB working on her balance with verbal cues to sit up tall and try to stay in mid-line. She was Total A x 2 to lay back down. She was left in supine with call light near. This section estabbelished at most recent assessment   PROBLEM LIST (Impairments causing functional limitations):  1. Decreased Strength  2. Decreased ADL/Functional Activities  3. Decreased Transfer Abilities  4. Decreased Balance  5. Decreased Activity Tolerance  6. Increased Fatigue  7. Increased Shortness of Breath  8. Decreased Flexibility/Joint Mobility  9.  Decreased [place] with Home Exercise Programal    INTERVENTIONS PLANNED: (Benefits and precautions of physical therapy have been discussed with the patient.)  1. Balance Exercise  2. Bed Mobility  3. Neuromuscular Re-education/Strengthening  4. Therapeutic Activites  5. Therapeutic Exercise/Strengthening  6. Transfer Training     TREATMENT PLAN: Frequency/Duration: daily ( or as indicated ) for duration of hospital stay  Rehabilitation Potential For Stated Goals: [facility] REHABILITATION/EQUIPMENT: (at time of discharge pending progress): Due to the probability of continued deficits (see above) this patient will likely need continued skilled physical therapy after discharge. Equipment:    to be determined              HISTORY:   History of Present Injury/Illness (Reason for Referral): Real Orozco is a 80 y.o. female who was brought to ER by her caretaker for fatigue.      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 No surgery or definite treatment was planned. She also had recent fall at home and had right arm fracture, currently on splint. No surgery was planned due to her overall poor health condition.      Patient was feeling weak for the past few days. No energy. She denies fever. No chills. No shortness of breath. No chest pain.      Patient reports a lot of bruises all over her body and at the right half of her face due to recent fall. No headache now.      In ER, patient was found to have anemia. Her Hb dropped to 8+ from baseline of 12+ recently.      Due to multiple medical problems and possibility of acute blood loss anemia, hospitalist service was asked to see patient. Past Medical History/Comorbidities:   Ms. Fernando Dominguez  has a past medical history of CHF (congestive heart failure) ([facility] [address] Coronary atherosclerosis of unspecified type of vessel, native or graft (8/19/2015); Esophageal reflux (8/19/2015); Gastrointestinal disorder; Gout (8/19/2015); Hypertension;  Other and unspecified hyperlipidemia (2015); Other ill-defined conditions(799.89); and Pure hypercholesterolemia (2015). Ms. Zo Bermudez  has a past surgical history that includes hx cholecystectomy and hx heent. Social History/Living Environment:   Home Environment: Private residence  # Steps to Enter: 1  One/Two Story Residence: Two story, live on 1st floor  Living Alone: No  Support Systems:  ( sitter service)  Patient Expects to be Discharged to[de-identified] Skilled nursing facility  Current DME Used/Available at Home: Walker, rollator  Prior Level of Function/Work/Activity:  Pt was functioning short distances with a Rolator & SBA, pt came supine <> sit & transferred out of a chair with moderate assist prior to this admission. Personal Factors: Other factors that influence how disability is experienced by the patient:  Current & PMH   Number of Personal Factors/Comorbidities that affect the Plan of Care: 3+: HIGH COMPLEXITY   EXAMINATION:   Most Recent Physical Functioning:   Gross Assessment:                       Balance:    Bed Mobility:  Supine to Sit: Maximum assistance;Assist x2  Sit to Supine: Total assistance;Assist x2  Scooting: Total assistance  Duration: 30 Minutes       Transfers:     Gait: NA         Functional Mobility:         Transfers:  max        Bed Mobility:  Max - 2 to TD   Body Structures Involved:  1. Heart  2. Metabolic  3. Muscles Body Functions Affected:  1. Cardio  2. Movement Related  3. Metobolic/Endocrine Activities and Participation Affected:  1. General Tasks and Demands  2. Mobility   Number of elements that affect the Plan of Care: 4+: HIGH COMPLEXITY   CLINICAL PRESENTATION:   Presentation: Evolving clinical presentation with unstable and unpredictable characteristics: HIGH COMPLEXITY   CLINICAL DECISION MAKIN [employer] Mobility Inpatient Short Form  How much difficulty does the patient currently have. .. Unable A Lot A Little None   1.   Turning over in bed (including adjusting bedclothes, sheets and blankets)? [] 1   [x] 2   [] 3   [] 4   2. Sitting down on and standing up from a chair with arms ( e.g., wheelchair, bedside commode, etc.)   [] 1   [x] 2   [] 3   [] 4   3. Moving from lying on back to sitting on the side of the bed? [] 1   [x] 2   [] 3   [] 4   How much help from another person does the patient currently need. .. Total A Lot A Little None   4. Moving to and from a bed to a chair (including a wheelchair)? [x] 1   [] 2   [] 3   [] 4   5. Need to walk in hospital room? [x] 1   [] 2   [] 3   [] 4   6. Climbing 3-5 steps with a railing? [x] 1   [] 2   [] 3   [] 4   © 2007, Trustees of [facility] [employer], under license to [employer]. All rights reserved      Score:  Initial: 9 Most Recent: X (Date: -- )    Interpretation of Tool:  Represents activities that are increasingly more difficult (i.e. Bed mobility, Transfers, Gait). Score 24 23 22-20 19-15 14-10 9-7 6     Modifier CH CI CJ CK CL CM CN      ? Mobility - Walking and Moving Around:     - CURRENT STATUS: CM - 80%-99% impaired, limited or restricted    - GOAL STATUS: CL - 60%-79% impaired, limited or restricted    - D/C STATUS:  ---------------To be determined---------------  [place] / [place] / Product Type: Medicare /      Medical Necessity:     · Patient is expected to demonstrate progress in strength, balance, coordination and functional technique to decrease assistance required with bed mobility, sitting balance & transfers. Reason for Services/Other Comments:  · Patient continues to require skilled intervention due to pt not being manageable for caregivers.    Use of outcome tool(s) and clinical judgement create a POC that gives a: Difficult prediction of patient's progress: HIGH COMPLEXITY            TREATMENT:   (In addition to Assessment/Re-Assessment sessions the following treatments were rendered)   Pre-treatment Symptoms/Complaints: pt very flat but agreeable  Pain: Initial: visual scale     Post Session:       Therapeutic Activity: (30 Minutes   ):  Therapeutic activities including rolling, supine<>sit, static & dynamic sitting balance (wt shifting EOB R<>L front,>back) repeated sit <>stand, when standing active assisted wt-shift R<>L, pt also performed AAROM to left arm & both LE's to stimulate purposeful movement to improve mobility, strength, balance, coordination and dynamic movement of arm - left and leg - bilateral to improve functional core stability. Braces/Orthotics/Lines/Etc:   · IV  Treatment/Session Assessment:    · Response to Treatment: tolerated session fairly  · Interdisciplinary Collaboration:   o Registered Nurse  o Rehabilitation Attendant  o Certified Nursing Assistant/Patient Care Technician  · After treatment position/precautions:   o Supine in bed  o Bed/Chair-wheels locked  o Bed in low position  o Caregiver at bedside  o Call light within reach  o RN notified  o Family at bedside   · Compliance with Program/Exercises: Will assess as treatment progresses. · Recommendations/Intent for next treatment session: \"Next visit will focus on reduction in assistance provided\".   Total Treatment Duration:  PT Patient Time In/Time Out  Time In: 0845  Time Out: 0015    Nubia Kim, PTA

## 2018-06-04 NOTE — PROGRESS NOTES
PRN Ativan 0.5 mg given PO for complaints of anxiety. Bed low and locked, call light within reach, aid and son at bedside. Will continue to monitor.

## 2018-06-04 NOTE — PROGRESS NOTES
Problem: Falls - Risk of  Goal: *Absence of Falls  Document Richard Fall Risk and appropriate interventions in the flowsheet. Outcome: Progressing Towards Goal  Fall Risk Interventions:  Mobility Interventions: Bed/chair exit alarm, Communicate number of staff needed for ambulation/transfer, PT Consult for mobility concerns    Mentation Interventions: Adequate sleep, hydration, pain control, Bed/chair exit alarm, Familiar objects from home, Family/sitter at bedside, Room close to nurse's station, Toileting rounds, Update white board    Medication Interventions: Bed/chair exit alarm    Elimination Interventions: Bed/chair exit alarm, Call light in reach, Toileting schedule/hourly rounds    History of Falls Interventions: Bed/chair exit alarm, Evaluate medications/consider consulting pharmacy, Room close to nurse's station        Problem: Pressure Injury - Risk of  Goal: *Prevention of pressure injury  Document Perico Scale and appropriate interventions in the flowsheet. Outcome: Progressing Towards Goal  Pressure Injury Interventions:  Sensory Interventions: Assess changes in LOC, Float heels    Moisture Interventions: Absorbent underpads    Activity Interventions: Assess need for specialty bed, Pressure redistribution bed/mattress(bed type), PT/OT evaluation    Mobility Interventions: Assess need for specialty bed, HOB 30 degrees or less, Pressure redistribution bed/mattress (bed type), Turn and reposition approx.  every two hours(pillow and wedges)    Nutrition Interventions: Document food/fluid/supplement intake    Friction and Shear Interventions: Apply protective barrier, creams and emollients

## 2018-06-04 NOTE — PROGRESS NOTES
Problem: Nutrition Deficit  Goal: *Optimize nutritional status  Nutrition  Reason for assessment:  Length of stay day 5. Assessment:   Diet order(s):  Regular with Ensure High Protein all meals  Food/Nutrition Patient History:  Pt presented for extreme fatigue. Past medical history notable for CHF, HTN, ovarian tumor protruding on her umbilical hernia, recent fall-right arm in a sling. No nutrition risk factors identified on nursing admission malnutrition screening tool. Pt lives alone with assistance from caregivers. Pt states her caregivers provide her meals; appetite fair at home; better at home than currently per caregiver in room. Anthropometrics:Height: 5' 4\" (162.6 cm),  Weight: 96 kg (211 lb 9.6 oz), Weight Source: Bed, Body mass index is 36.32 kg/(m^2). BMI class of Obesity Class 1 for Older American Women. Macronutrient needs:  EER: 1201-6604 kcal /day (12-15 kcal/kg BW)  EPR:  55-66 grams protein/day (1-1.2 grams/kg IBW) GFR 43  Intake/Comparative Standards: Per RD meal rounds: 25% supper meal and part of an Ensure High Protein (per patient, poor po intake r/t decreased appetite). 2 recorded intake with average intake of 27%. One recorded intake of Ensure High Protein at 50%. Based on verbalized intake and ~ 1 1/2 Ensure High Protein/day, pt potentially meets 25-50% estimated kcal and protein needs. Nutrition Diagnosis: Inadequate oral intake r/t decreased ability to consume sufficient oral intake as evidenced by estimates of insufficient intake of energy or high quality protein from diet when compared to requirements. Intervention:  Meals and snacks: Continue current diet. Encouraged po intake. Nutrition Supplement Therapy: Pt declined   Discharge nutrition plan: Too soon to determine.   Coordination of Nutrition Care:  Interdisciplinary Rounds    Lazarus Ax, [address], MPH  [phone]

## 2018-06-04 NOTE — PROGRESS NOTES
Progress Note    Patient: Real Orozco MRN: [id number]  SSN: [id number]    YOB: 1934  Age: 80 y.o. Sex: female      Admit Date: 5/30/2018    LOS: 5 days     Subjective:     Patient ate well this morning. No fever. No new complaints. No abdominal pain. Objective:     Vitals:    06/04/18 0138 06/04/18 0502 06/04/18 0757 06/04/18 0800   BP: 145/64 178/50  165/79   Pulse: 73 71  75   Resp: 18 18  14   Temp: 98.5 °F (36.9 °C) 98 °F (36.7 °C)  96.8 °F (36 °C)   SpO2: 91% 94% 91% 96%   Weight:       Height:            Intake and Output:  Current Shift: 06/04 0701 - 06/04 1900  In: -   Out: 150 [Urine:150]  Last three shifts: 06/02 1901 - 06/04 0700  In: -   Out: 850 [Urine:850]    Physical Exam:   General:                    The patient is an elderly female in no acute respiratory distress. Appears chronically ill. Lying flat in bed. Distended abdomen. More spontaneous today. Head:                                   Normocephalic. Bruises surrounding right eye and cheek. Eyes:                                   + palpebral pallor, no scleral icterus. ENT:                                    External auricular and nasal exam within normal limits.                                             WTRHOE membranes are moist.  Neck:                                   Supple, non-tender, no JVD. Lungs:                       Clear to auscultation bilaterally without wheezes or crackles.                                             No respiratory distress or accessory muscle use. Heart:                                  Regular rate and rhythm, without murmurs, rubs, or gallops. Abdomen:                  Soft, non-tender, very distended with normoactive bowel sounds. Positive for ventral hernia with palpation of a mass underneath it. Dilation of superficial abdominal wall veins at the ventral hernia area.    Genitourinary:           No tenderness over the bladder or bilateral CVAs.  Extremities:               Without clubbing, cyanosis. Leg edema 1+. Some skin changes. Weeping lesion on the right shin. Right arm in a sling. Skin:                                    Normal color, texture, and turgor. Multiple bruises. Pulses:                      Radial and dorsalis pedis pulses present 2+ bilaterally.                                               Capillary refill <2s. Neurologic:                CN II-XII grossly intact and symmetrical.                                               Moving all four extremities well with no focal deficits. Psychiatric:                Pleasant demeanor, appropriate affect. Alert and oriented x 3      Lab/Data Review:    Recent Results (from the past 24 hour(s))   PLEASE READ & DOCUMENT PPD TEST IN 24 HRS    Collection Time: 06/03/18  2:07 PM   Result Value Ref Range    PPD Negative Negative    mm Induration 0.0 mm     All Micro Results     Procedure Component Value Units Date/Time    CULTURE, URINE [[id number]] Collected:  06/02/18 1721    Order Status:  Completed Specimen:  Urine from Clean catch Updated:  06/04/18 0808     Special Requests: NO SPECIAL REQUESTS        Culture result:         <10,000 COLONIES/mL MIXED SKIN CHANNING ISOLATED    CULTURE, BLOOD [[id number]] Collected:  06/02/18 1118    Order Status:  Completed Specimen:  Blood from Blood Updated:  06/04/18 0723     Special Requests: --        LEFT  HAND       Culture result: NO GROWTH 2 DAYS           6/2/2018 11:41 AM - [facility], Lab In [facility]   Component Results   Component Value Flag Ref Range Units Status   Sodium 139  136 - 145 mmol/L Final   Potassium 4.1  3.5 - 5.1 mmol/L Final   Chloride 103  98 - 107 mmol/L Final   CO2 25  21 - 32 mmol/L Final   Anion gap 11  7 - 16 mmol/L Final   Glucose 147 (H) 65 - 100 mg/dL Final   Comment:   47 - 60 mg/dl Consistent with, but not fully diagnostic of hypoglycemia.    101 - 125 mg/dl Impaired fasting glucose/consistent with pre-diabetes mellitus   > 126 mg/dl Fasting glucose consistent with overt diabetes mellitus      BUN 21  8 - 23 MG/DL Final   Creatinine 1.27 (H) 0.6 - 1.0 MG/DL Final   GFR est AA 52 (L) >60 ml/min/1.73m2 Final   GFR est non-AA 43 (L) >60 ml/min/1.73m2 Final   Calcium 8.4  8.3 - 10.4 MG/DL Final     6/2/2018 12:05 PM - [facility], Lab In [facility]   Component Results   Component Value Flag Ref Range Units Status   WBC 17.9 (H) 4.3 - 11.1 K/uL Final   RBC 3.48 (L) 4.05 - 5.25 M/uL Final   HGB 8.5 (L) 11.7 - 15.4 g/dL Final   HCT 27.5 (L) 35.8 - 46.3 % Final   MCV 79.0 (L) 79.6 - 97.8 FL Final   MCH 24.4 (L) 26.1 - 32.9 PG Final   MCHC 30.9 (L) 31.4 - 35.0 g/dL Final   RDW 19.0 (H) 11.9 - 14.6 % Final   PLATELET 246  068 - 468 K/uL Final   MPV 10.1 (L) 10.8 - 14.1 FL Final   NEUTROPHILS 83 (H) 47 - 75 % Final   BAND NEUTROPHILS 5  0 - 6 % Final   LYMPHOCYTES 4 (L) 16 - 44 % Final   MONOCYTES 5  3 - 9 % Final   EOSINOPHILS 2  1 - 8 % Final   METAMYELOCYTES 1   % Final   ABS. NEUTROPHILS 15.9 (H) 1.7 - 8.2 K/UL Final   ABS. LYMPHOCYTES 0.7  0.5 - 4.6 K/UL Final   ABS. MONOCYTES 0.9  0.1 - 1.3 K/UL Final   ABS. EOSINOPHILS 0.4  0.0 - 0.8 K/UL Final         Assessment:     Principal Problem:    Fatigue (5/30/2018)    Active Problems:    Essential hypertension, benign (8/19/2015)      Pure hypercholesterolemia (8/19/2015)      Ventral hernia without obstruction or gangrene (4/13/2018)      Anemia (5/30/2018)      Ovarian tumor (5/30/2018)      Fall at home, subsequent encounter (5/30/2018)        Plan:     Fatigue  Failure to thrive  Ovarian tumor with cyst with distended abdomen affecting appetite and feeling satiety. Encourage oral intake. I discussed with nursing team. Will arrange for patient o go to [facility] for taping to drain fluid from ovarian cyst as per Dr. Mikhail Sandhu order. Fever  Cultures of blood and urine are negative so far. No evidence of infiltrates in CXR. continue empiric antibiotics. Afebrile now. Monitor closely.      Anemia  Hb is holding up. No transfusion is indicated now. Physical therapy is working on improving strength and ambulation.      I have discussed the plan of care with patient and caretaker at bedside and nursing team.     DVT prophylaxis : SCD    Signed By: Dede Jason MD     June 4, 2018

## 2018-06-05 ENCOUNTER — HOSPITAL ENCOUNTER (OUTPATIENT)
Dept: CT IMAGING | Age: 83
Discharge: HOME | End: 2018-06-05
Attending: OBSTETRICS & GYNECOLOGY
Payer: MEDICARE

## 2018-06-05 VITALS
SYSTOLIC BLOOD PRESSURE: 154 MMHG | HEART RATE: 99 BPM | TEMPERATURE: 98.7 F | DIASTOLIC BLOOD PRESSURE: 72 MMHG | OXYGEN SATURATION: 94 % | RESPIRATION RATE: 17 BRPM

## 2018-06-05 LAB
BACTERIA SPEC CULT: NORMAL
MM INDURATION POC: NORMAL MM (ref 0–5)
PPD POC: NORMAL NEGATIVE
SERVICE CMNT-IMP: NORMAL
VANCOMYCIN TROUGH SERPL-MCNC: 14.9 UG/ML (ref 5–20)

## 2018-06-05 PROCEDURE — 74011000258 HC RX REV CODE- 258: Performed by: INTERNAL MEDICINE

## 2018-06-05 PROCEDURE — 94760 N-INVAS EAR/PLS OXIMETRY 1: CPT

## 2018-06-05 PROCEDURE — 77012 CT SCAN FOR NEEDLE BIOPSY: CPT

## 2018-06-05 PROCEDURE — 65270000029 HC RM PRIVATE

## 2018-06-05 PROCEDURE — 36415 COLL VENOUS BLD VENIPUNCTURE: CPT | Performed by: INTERNAL MEDICINE

## 2018-06-05 PROCEDURE — 80202 ASSAY OF VANCOMYCIN: CPT | Performed by: INTERNAL MEDICINE

## 2018-06-05 PROCEDURE — 74011250636 HC RX REV CODE- 250/636: Performed by: INTERNAL MEDICINE

## 2018-06-05 PROCEDURE — 74011000250 HC RX REV CODE- 250: Performed by: RADIOLOGY

## 2018-06-05 PROCEDURE — 74011250636 HC RX REV CODE- 250/636

## 2018-06-05 PROCEDURE — 77010033678 HC OXYGEN DAILY

## 2018-06-05 PROCEDURE — 74011250637 HC RX REV CODE- 250/637: Performed by: INTERNAL MEDICINE

## 2018-06-05 RX ORDER — CITALOPRAM 20 MG/1
20 TABLET, FILM COATED ORAL DAILY
Status: DISCONTINUED | OUTPATIENT
Start: 2018-06-05 | End: 2018-06-08 | Stop reason: HOSPADM

## 2018-06-05 RX ORDER — FLUTICASONE PROPIONATE 50 MCG
1 SPRAY, SUSPENSION (ML) NASAL DAILY
Status: DISCONTINUED | OUTPATIENT
Start: 2018-06-05 | End: 2018-06-08 | Stop reason: HOSPADM

## 2018-06-05 RX ORDER — PANTOPRAZOLE SODIUM 40 MG/1
40 TABLET, DELAYED RELEASE ORAL
Status: DISCONTINUED | OUTPATIENT
Start: 2018-06-06 | End: 2018-06-08 | Stop reason: HOSPADM

## 2018-06-05 RX ORDER — SODIUM CHLORIDE 0.9 % (FLUSH) 0.9 %
5-10 SYRINGE (ML) INJECTION AS NEEDED
Status: DISCONTINUED | OUTPATIENT
Start: 2018-06-05 | End: 2018-06-08 | Stop reason: HOSPADM

## 2018-06-05 RX ORDER — HYDROCODONE BITARTRATE AND ACETAMINOPHEN 5; 325 MG/1; MG/1
1 TABLET ORAL
Status: DISCONTINUED | OUTPATIENT
Start: 2018-06-05 | End: 2018-06-08 | Stop reason: HOSPADM

## 2018-06-05 RX ORDER — DIPHENHYDRAMINE HYDROCHLORIDE 50 MG/ML
12.5 INJECTION, SOLUTION INTRAMUSCULAR; INTRAVENOUS
Status: DISCONTINUED | OUTPATIENT
Start: 2018-06-05 | End: 2018-06-08 | Stop reason: HOSPADM

## 2018-06-05 RX ORDER — ONDANSETRON 2 MG/ML
4 INJECTION INTRAMUSCULAR; INTRAVENOUS
Status: DISCONTINUED | OUTPATIENT
Start: 2018-06-05 | End: 2018-06-08 | Stop reason: HOSPADM

## 2018-06-05 RX ORDER — LIDOCAINE HYDROCHLORIDE 20 MG/ML
1-10 INJECTION, SOLUTION INFILTRATION; PERINEURAL ONCE
Status: COMPLETED | OUTPATIENT
Start: 2018-06-05 | End: 2018-06-05

## 2018-06-05 RX ORDER — POTASSIUM CHLORIDE 20 MEQ/1
40 TABLET, EXTENDED RELEASE ORAL DAILY
Status: DISCONTINUED | OUTPATIENT
Start: 2018-06-05 | End: 2018-06-08 | Stop reason: HOSPADM

## 2018-06-05 RX ORDER — SODIUM CHLORIDE 0.9 % (FLUSH) 0.9 %
5-10 SYRINGE (ML) INJECTION EVERY 8 HOURS
Status: DISCONTINUED | OUTPATIENT
Start: 2018-06-05 | End: 2018-06-08 | Stop reason: HOSPADM

## 2018-06-05 RX ORDER — DONEPEZIL HYDROCHLORIDE 5 MG/1
10 TABLET, FILM COATED ORAL
Status: DISCONTINUED | OUTPATIENT
Start: 2018-06-05 | End: 2018-06-08 | Stop reason: HOSPADM

## 2018-06-05 RX ORDER — LORATADINE 10 MG/1
10 TABLET ORAL DAILY
Status: DISCONTINUED | OUTPATIENT
Start: 2018-06-05 | End: 2018-06-08 | Stop reason: HOSPADM

## 2018-06-05 RX ORDER — ZOLPIDEM TARTRATE 5 MG/1
5 TABLET ORAL
Status: DISCONTINUED | OUTPATIENT
Start: 2018-06-05 | End: 2018-06-08 | Stop reason: HOSPADM

## 2018-06-05 RX ORDER — GUAIFENESIN 100 MG/5ML
81 LIQUID (ML) ORAL DAILY
Status: DISCONTINUED | OUTPATIENT
Start: 2018-06-06 | End: 2018-06-08 | Stop reason: HOSPADM

## 2018-06-05 RX ORDER — ALLOPURINOL 100 MG/1
100 TABLET ORAL 2 TIMES DAILY
Status: DISCONTINUED | OUTPATIENT
Start: 2018-06-05 | End: 2018-06-08 | Stop reason: HOSPADM

## 2018-06-05 RX ORDER — CLONAZEPAM 0.5 MG/1
0.5 TABLET ORAL 2 TIMES DAILY
Status: DISCONTINUED | OUTPATIENT
Start: 2018-06-05 | End: 2018-06-08 | Stop reason: HOSPADM

## 2018-06-05 RX ORDER — LISINOPRIL 20 MG/1
40 TABLET ORAL DAILY
Status: DISCONTINUED | OUTPATIENT
Start: 2018-06-05 | End: 2018-06-08 | Stop reason: HOSPADM

## 2018-06-05 RX ORDER — METOPROLOL SUCCINATE 50 MG/1
50 TABLET, EXTENDED RELEASE ORAL DAILY
Status: DISCONTINUED | OUTPATIENT
Start: 2018-06-05 | End: 2018-06-08 | Stop reason: HOSPADM

## 2018-06-05 RX ORDER — FENTANYL CITRATE 50 UG/ML
25-50 INJECTION, SOLUTION INTRAMUSCULAR; INTRAVENOUS
Status: DISCONTINUED | OUTPATIENT
Start: 2018-06-05 | End: 2018-06-05 | Stop reason: ALTCHOICE

## 2018-06-05 RX ORDER — FUROSEMIDE 40 MG/1
40 TABLET ORAL
Status: DISCONTINUED | OUTPATIENT
Start: 2018-06-05 | End: 2018-06-08 | Stop reason: HOSPADM

## 2018-06-05 RX ORDER — VANCOMYCIN/0.9 % SOD CHLORIDE 1.5G/250ML
1500 PLASTIC BAG, INJECTION (ML) INTRAVENOUS EVERY 24 HOURS
Status: DISCONTINUED | OUTPATIENT
Start: 2018-06-05 | End: 2018-06-08 | Stop reason: HOSPADM

## 2018-06-05 RX ORDER — AMOXICILLIN 250 MG
1 CAPSULE ORAL DAILY
Status: DISCONTINUED | OUTPATIENT
Start: 2018-06-05 | End: 2018-06-08 | Stop reason: HOSPADM

## 2018-06-05 RX ORDER — BUPROPION HYDROCHLORIDE 150 MG/1
150 TABLET, EXTENDED RELEASE ORAL DAILY
Status: DISCONTINUED | OUTPATIENT
Start: 2018-06-05 | End: 2018-06-08 | Stop reason: HOSPADM

## 2018-06-05 RX ORDER — ACETAMINOPHEN 325 MG/1
650 TABLET ORAL
Status: DISCONTINUED | OUTPATIENT
Start: 2018-06-05 | End: 2018-06-08 | Stop reason: HOSPADM

## 2018-06-05 RX ADMIN — FLUTICASONE PROPIONATE 1 SPRAY: 50 SPRAY, METERED NASAL at 14:19

## 2018-06-05 RX ADMIN — LIDOCAINE HYDROCHLORIDE 100 MG: 20 INJECTION, SOLUTION INFILTRATION; PERINEURAL at 09:50

## 2018-06-05 RX ADMIN — VANCOMYCIN HYDROCHLORIDE 1500 MG: 10 INJECTION, POWDER, LYOPHILIZED, FOR SOLUTION INTRAVENOUS at 16:02

## 2018-06-05 RX ADMIN — METOPROLOL SUCCINATE 50 MG: 50 TABLET, EXTENDED RELEASE ORAL at 14:13

## 2018-06-05 RX ADMIN — SODIUM BICARBONATE 2 ML: 0.2 INJECTION, SOLUTION INTRAVENOUS at 09:50

## 2018-06-05 RX ADMIN — LORATADINE 10 MG: 10 TABLET ORAL at 14:13

## 2018-06-05 RX ADMIN — BUPROPION HYDROCHLORIDE 150 MG: 150 TABLET, EXTENDED RELEASE ORAL at 14:13

## 2018-06-05 RX ADMIN — FUROSEMIDE 40 MG: 40 TABLET ORAL at 17:01

## 2018-06-05 RX ADMIN — POTASSIUM CHLORIDE 40 MEQ: 20 TABLET, EXTENDED RELEASE ORAL at 14:13

## 2018-06-05 RX ADMIN — CITALOPRAM HYDROBROMIDE 20 MG: 20 TABLET ORAL at 14:13

## 2018-06-05 RX ADMIN — FENTANYL CITRATE 25 MCG: 50 INJECTION, SOLUTION INTRAMUSCULAR; INTRAVENOUS at 09:47

## 2018-06-05 RX ADMIN — SENNOSIDES AND DOCUSATE SODIUM 1 TABLET: 8.6; 5 TABLET ORAL at 14:13

## 2018-06-05 RX ADMIN — LISINOPRIL 40 MG: 20 TABLET ORAL at 14:13

## 2018-06-05 RX ADMIN — DONEPEZIL HYDROCHLORIDE 10 MG: 5 TABLET, FILM COATED ORAL at 22:04

## 2018-06-05 RX ADMIN — CLONAZEPAM 0.5 MG: 0.5 TABLET ORAL at 20:13

## 2018-06-05 RX ADMIN — ALLOPURINOL 100 MG: 100 TABLET ORAL at 22:04

## 2018-06-05 RX ADMIN — ALLOPURINOL 100 MG: 100 TABLET ORAL at 14:13

## 2018-06-05 RX ADMIN — Medication 10 ML: at 22:09

## 2018-06-05 RX ADMIN — CEFTRIAXONE 1 G: 1 INJECTION, POWDER, FOR SOLUTION INTRAMUSCULAR; INTRAVENOUS at 14:12

## 2018-06-05 RX ADMIN — Medication 10 ML: at 06:35

## 2018-06-05 RX ADMIN — CLONAZEPAM 0.5 MG: 0.5 TABLET ORAL at 14:13

## 2018-06-05 NOTE — PROGRESS NOTES
Problem: Interdisciplinary Rounds  Goal: Interdisciplinary Rounds  Interdisciplinary team rounds were held 6/5/2018 with the following team members:Care Management, Nursing, Nutrition, Pharmacy, Physical Therapy and Physician and the patient was busy. Plan of care discussed. See clinical pathway and/or care plan for interventions and desired outcomes.

## 2018-06-05 NOTE — PROCEDURES
Department of Interventional Radiology  [phone]        Interventional Radiology Brief Procedure Note    Patient: Rasta Hanna MRN: [id number]  SSN: [id number]    YOB: 1934  Age: 80 y.o. Sex: female      Date of Procedure: 6/5/2018    Pre-Procedure Diagnosis: Ovarian cancer with large cystic component    Post-Procedure Diagnosis: Progression of ovarian cancer with larger tumor. Fluid is now bloody, gelatinous, impossible to completely aspirate thru a drain catheter. Procedure(s): CT guided temporary drain placement. Brief Description of Procedure: 8 Fr drain placed and removed. About 200 cc thick, gelatinous, bloody fluid removed. Performed By: Puma Peraza MD     Assistants: None    Anesthesia:Lidocaine. IV Fentanyl for pain. Estimated Blood Loss: None    Specimens: None    Implants: None    Findings: as above. Complications: None    Recommendations: I do not recommend repeating this procedure as there is little fluid to aspirate. The tumor has grown significantly. Follow Up: PRN.      Signed By: Puma Peraza MD     June 5, 2018

## 2018-06-05 NOTE — PROGRESS NOTES
Hospitalist Progress Note    2018  Admit Date: 2018 11:04 AM   NAME: Ibrahima Aburto   :  1934   MRN:  [id number]   Attending: Edyta Martinez MD  PCP:  Regi Andrade MD    SUBJECTIVE:   Per chart review, patient is an 80yoF who was initially admitted to the hospital on 18 after presenting with her caretaker for \"fatigue. \"  She had a recent fall at home with subsequent R arm fracture with declining condition over the last several days prior to admission. She complained of weakness. Patient found to be anemic on admission with hgb of 8 (with baseline of around 12). Patient has a known history of an ovarian tumor. Dr. Lieutenant Torres of [facility] has been following the patient and ordered an IR-guided drainage of the cystic portion of this ovarian tumor which was performed this morning at [facility] by Dr. Nicolle Rocha. She returned to [facility] without incident. On evaluation of medical records, it is not clear if she has had any prior biopsy, but patient did have aspiration of ovarian cyst in February of this year, with pathology showing atypical cells. Per current hospital notes, it seems as though today's intervention was therapeutic, as ovarian mass was protruding through her umbilical hernia. On my initial introduction to the patient this afternoon, patient's caretaker was at bedside. She introduced herself as such when I asked the patient how she was related to the lady sitting in the bedside chair. I began summarizing what I had gleaned from her chart prior to my bedside evaluation. Mrs. Padilla denied any current complaints or pain after her procedure. Caretaker at bedside expressed concern that her ovarian mass was \"malignant,\" I advised her that I could not make that determination as per chart review, she had an aspiration of this cyst in February and this showed atypical cells. I asked the patient if I could speak to her caretaker about her medical problems, and she agreed.   The caretaker immediately attempted to get the patient's son, Omero Villa, on the phone, as she told me that he is her POA and her daughter will make all decisions. I asked the patient if this was her wish, and the caretaker mouthed to me that the patient \"has dementia\" and \"her children make the decisions. \"  The patient gave me permission to speak with her children about her medical issues. The caretaker could not get Mr. Padilla on the phone, but did reach Ms. Guero Virgen. I reiterated to her daughter that the patient had tolerated her procedure, but interventional radiology does not recommend repeat aspirations as risks likely outweigh benefits. I asked the patient's daughter if they had ever gotten a biopsy or cytology report confirming ovarian malignancy, she reported no. The caregiver and the daughter both express wish for the aspirate from this morning's procedure be sent for pathology, as per both their responses of that they \"need to know if it is cancer\" so a \"decision\" can be made of what to do next. I explain that often in the case of elderly patient's, some people do not wish to pursue further work-up for cancer as the process can be difficult. Both daughter and caregiver respond that they would want to pursue additional work-up. Daughter confirms that the patient has dementia and is on Aricept.       Review of Systems negative with exception of pertinent positives noted above  PHYSICAL EXAM     Visit Vitals    /82    Pulse 97    Temp 98.3 °F (36.8 °C)    Resp 18    Ht 5' 4\" (1.626 m)    Wt 95.8 kg (211 lb 4.8 oz)    SpO2 90%    BMI 36.27 kg/m2      Temp (24hrs), Av.4 °F (36.9 °C), Min:97.8 °F (36.6 °C), Max:99.2 °F (37.3 °C)    Oxygen Therapy  O2 Sat (%): 90 % (18 1134)  Pulse via Oximetry: 77 beats per minute (18 075)  O2 Device: Room air (18 0715)  O2 Flow Rate (L/min): 1 l/min (18 0315)  FIO2 (%): 24 % (18 0757)    Intake/Output Summary (Last 24 hours) at 06/05/18 1704  Last data filed at 06/04/18 2356   Gross per 24 hour   Intake                0 ml   Output              125 ml   Net             -125 ml      General: No acute distress    Lungs:  CTA Bilaterally. Heart:  Regular rate and rhythm,  No murmur, rub, or gallop  Abdomen: Soft, Non distended, Non tender, Positive bowel sounds. Bandage over umbilicus. Extremities: No cyanosis, clubbing or edema  Neurologic:  No focal deficits    ASSESSMENT      Active Hospital Problems    Diagnosis Date Noted    Fatigue 05/30/2018    Anemia 05/30/2018    Ovarian tumor 05/30/2018    Fall at home, subsequent encounter 05/30/2018    Ventral hernia without obstruction or gangrene 04/13/2018    Essential hypertension, benign 08/19/2015    Pure hypercholesterolemia 08/19/2015     Plan:  Ovarian Tumor  - Upon chart review, I could not find definitive record that this is known to be malignant, but Gyn-Onc has been following  - Staff report that the patient's family was aware that she had \"cancer\" but it is unclear if this has been discussed with the patient. - I was clear with the patient today, that given the rapid growth and bloody aspirate there is a definite concern for cancer  - Caregiver and daughter reiterate that decisions should be made by POA son and daughter  - Patient has been A&O x4 throughout her hospitalization per staff and seems capable of making medical decisions, but she does tell me that she will defer decisions at this time to her children  - After evaluation and discussion today, I contacted IR about sending aspirate to pathology for cytology and put in an order for this, per family request.  Pt's nurse, Edita Mehta called laboratory as well immediately after finding out family's request.  Unfortunately, no aspirate remains for pathology review. - Unclear of knowledge of possible diagnosis among patient and family at this time.   Will defer further conversation to Gynecology-Oncology    Anemia  - Hgb stable  - Monitor closely    HTN  - BPs stable  - Continue home meds    Fever  - None today  - On empiric abx without source of infection    Weakness/Failure to Thrive  - PT  - Encourage PO      DISPO:  Clarify diagnosis and plan. Continue supportive care and work-up.     Signed By: Varsha Aggarwal MD     June 5, 2018

## 2018-06-05 NOTE — PROGRESS NOTES
TRANSFER - OUT REPORT:    Verbal report given to Evaristo Aranda RN on Bee Jacobsen  being transferred to [address] [place] [address] for routine post - op       Report consisted of patients Situation, Background, Assessment and   Recommendations(SBAR). Information from the following report(s) SBAR, Kardex, Procedure Summary, Intake/Output and MAR was reviewed with the receiving nurse. Lines:   Peripheral IV 06/03/18 Left Wrist (Active)   Site Assessment Clean, dry, & intact 6/5/2018  7:15 AM   Phlebitis Assessment 0 6/5/2018  7:15 AM   Infiltration Assessment 0 6/5/2018  7:15 AM   Dressing Status Clean, dry, & intact 6/5/2018  7:15 AM   Dressing Type Transparent;Tape 6/5/2018  7:15 AM   Hub Color/Line Status Blue 6/5/2018  7:15 AM   Action Taken Other (comment) 6/5/2018  7:15 AM        Opportunity for questions and clarification was provided.       Patient transported with:   O2 @ 2 liters

## 2018-06-05 NOTE — PROGRESS NOTES
Shift assessment complete. Patient alert and oriented person, place, and time. Respirations present, even and unlabored. HOB elevated and patient denies any SOB at this time. Heart: S1 & S2 auscultated, heart with regular rate and rhythm. Abdomen distended and tender at the area of her umbilicus. The umbilicus is protruding, hot to the touch and large. Bowel sounds active in all 4 quadrants. 1+ non pitting edema to bilateral upper extremities. 2+ non pitting edema to bilateral lower extremities. Patient's skin assessment shows bruising to the right eye. Ecchymosis present to the right upper arm. Sling and ACE wrap in use. Patient is continent and using bedpan with backup use of briefs for stress incontinence. IV HW and flushes well. She is NPO at this time for a procedure downtown. The bed is low and locked in position, side rails x3, call light within reach. Patient re-instructed on use of incentive spirometer and patient can demonstrate it's use as well. Will continue to follow patient's progression through hourly rounding. All patient's needs will be met as requested and as appropriate.

## 2018-06-05 NOTE — PROGRESS NOTES
Patient returned to room 350 after procedure in [address] [place] [address]. Patient reassessed and she has a puncture site to the right of her umbilicus covered in 2x2 telfa gauze. Patient's family at bedside. Spoke with Jessica Paz at bedside. Awaiting bedside visit from physician.

## 2018-06-05 NOTE — PROGRESS NOTES
TRANSFER - IN REPORT:    Verbal report received from [facility]) on [employer]  being received from CT(unit) for routine post - op      Report consisted of patients Situation, Background, Assessment and   Recommendations(SBAR). Information from the following report(s) Procedure Summary and MAR was reviewed with the receiving nurse. Opportunity for questions and clarification was provided. Assessment completed upon patients arrival to unit and care assumed.

## 2018-06-05 NOTE — PROGRESS NOTES
Shift assessment complete. Pt alert and oriented x2, respirations diminished, even and unlabored, HOB elevated, pt denies any SOB at this time, S1&S2 auscultated, HR regular, abd distended, non- tender, Active BS in all 4 quadrants, protruding umbilicus tumor warm and hard, No pressure ulcers noted, +2 edema noted in BLE, voiding, SCDs in place and functioning, pt denies any pain at this time, pt instructed to call for assistance, pt verbalizes understanding, bed low and locked, side rails x3, call light within reach, family at bedside.

## 2018-06-05 NOTE — PROGRESS NOTES
TRANSFER - OUT REPORT:    Verbal report given to Anita Jaime RN on Erum Maradiaga  being transferred to IR PACU for routine post - op       Report consisted of patients Situation, Background, Assessment and   Recommendations(SBAR). Information from the following report(s) SBAR, Kardex, Procedure Summary, Intake/Output and MAR was reviewed with the receiving nurse. Lines:   Peripheral IV 06/03/18 Left Wrist (Active)   Site Assessment Clean, dry, & intact 6/5/2018  7:15 AM   Phlebitis Assessment 0 6/5/2018  7:15 AM   Infiltration Assessment 0 6/5/2018  7:15 AM   Dressing Status Clean, dry, & intact 6/5/2018  7:15 AM   Dressing Type Transparent;Tape 6/5/2018  7:15 AM   Hub Color/Line Status Blue 6/5/2018  7:15 AM   Action Taken Other (comment) 6/5/2018  7:15 AM        Opportunity for questions and clarification was provided.       Patient transported with:   O2 @ 2 liters

## 2018-06-05 NOTE — PROGRESS NOTES
Vancomycin Consult (Day 4)    MD ordering: Yoel ROY following? no  Indication: sepsis  DOT:  unknown days  Goal level(s): 15 - 20    Allergies as of 2018 - Review Complete 2018   Allergen Reaction Noted    Gold au 198 Unknown (comments) 2015    Sulfa (sulfonamide antibiotics) Unknown (comments) 2015     Current Antimicrobial Therapy (168h ago through future)      Ordered     Start Stop      18 1154  vancomycin (VANCOCIN) 1500 mg in  ml infusion  1,500 mg,   IntraVENous,   EVERY 24 HOURS      18 1500 --    18 1154  cefTRIAXone (ROCEPHIN) 1 g in 0.9% sodium chloride (MBP/ADV) 50 mL  1 g,   IntraVENous,   EVERY 24 HOURS      18 1200 --            All Micro Results       Procedure Component Value Units Date/Time      CULTURE, URINE [[id number]] Collected:  18 1721    Order Status:  Completed Specimen:  Urine from Clean catch Updated:  18 0708     Special Requests: NO SPECIAL REQUESTS        Culture result:         <10,000 COLONIES/mL MIXED SKIN CHANNING ISOLATED    CULTURE, BLOOD [[id number]] Collected:  18 1118    Order Status:  Completed Specimen:  Blood from Blood Updated:  18 0655     Special Requests: --        LEFT  HAND       Culture result: NO GROWTH 3 DAYS               Temp (24hrs), Av.4 °F (36.9 °C), Min:97.8 °F (36.6 °C), Max:99.2 °F (37.3 °C)    CrCl 38 ml/min  Dosing weight: 96 kg   80 y.o. Date:  Dose/Freq Admin Times Level/Time:   18 Vanc 1750 mg IV x 1 dose 1327     6/3/18 Vanc 1500 mg IV q24h 1440     18 Vanc 1500 mg IV q24h 1443     18 Vanc 1500 mg IV q24h Due at 1500 Tr @ 1459 = 14.9   18 Vanc 1500 mg IV q24h Due at 1600               No results for input(s): BUN, CREA, WBC, PCT, LAC, LCAD, LACT, LACPOC in the last 72 hours. No lab exists for component: PROCAT  Estimated Creatinine Clearance: 37.7 mL/min (based on Cr of 1.27).     A/P:    Trough level is slightly less than desired goal. Anticipate further increase with continued dosing. Continue current dose of Vancomycin 1500 mg IV every 24 hours. Will repeat trough tomorrow at 1500, prior to 1600 dose  Will continue to follow.        Thank you,     Sam Moe, PharmD

## 2018-06-05 NOTE — PROGRESS NOTES
Problem: Falls - Risk of  Goal: *Absence of Falls  Document Richard Fall Risk and appropriate interventions in the flowsheet. Outcome: Not Progressing Towards Goal  Fall Risk Interventions:  Mobility Interventions: Bed/chair exit alarm    Mentation Interventions: Adequate sleep, hydration, pain control, Bed/chair exit alarm    Medication Interventions: Assess postural VS orthostatic hypotension, Bed/chair exit alarm    Elimination Interventions: Bed/chair exit alarm, Call light in reach    History of Falls Interventions: Bed/chair exit alarm        Problem: Pressure Injury - Risk of  Goal: *Prevention of pressure injury  Document Perico Scale and appropriate interventions in the flowsheet.    Outcome: Not Progressing Towards Goal  Pressure Injury Interventions:  Sensory Interventions: Assess changes in LOC    Moisture Interventions: Absorbent underpads    Activity Interventions: Assess need for specialty bed, PT/OT evaluation    Mobility Interventions: Assess need for specialty bed, PT/OT evaluation    Nutrition Interventions: Document food/fluid/supplement intake, Discuss nutritional consult with provider    Friction and Shear Interventions: Apply protective barrier, creams and emollients

## 2018-06-05 NOTE — PROGRESS NOTES
TRANSFER - OUT REPORT:    Verbal report given to BRAXTON Austin(name) on [employer]  being transferred to [facility](unit) for ordered procedure       Report consisted of patients Situation, Background, Assessment and   Recommendations(SBAR). Information from the following report(s) SBAR, Kardex, Intake/Output, MAR and Recent Results was reviewed with the receiving nurse. Lines:   Peripheral IV 06/03/18 Left Wrist (Active)   Site Assessment Clean, dry, & intact 6/5/2018  3:15 AM   Phlebitis Assessment 0 6/5/2018  3:15 AM   Infiltration Assessment 0 6/5/2018  3:15 AM   Dressing Status Clean, dry, & intact 6/5/2018  3:15 AM   Dressing Type Transparent;Tape 6/5/2018  3:15 AM   Hub Color/Line Status Patent;Capped 6/5/2018  3:15 AM        Opportunity for questions and clarification was provided.       Patient transported with:   O2 @ 1 liters

## 2018-06-05 NOTE — PROGRESS NOTES
Large ovarian cyst.  Ovarian cancer. Cyst drainage for palliation purposes. Alternatives discussed w patient and daughter. They wish to proceed with aspiration, only.      Aixa Morin MD

## 2018-06-06 ENCOUNTER — APPOINTMENT (OUTPATIENT)
Dept: GENERAL RADIOLOGY | Age: 83
DRG: 688 | End: 2018-06-06
Attending: ORTHOPAEDIC SURGERY
Payer: MEDICARE

## 2018-06-06 LAB
ANION GAP SERPL CALC-SCNC: 9 MMOL/L (ref 7–16)
BUN SERPL-MCNC: 24 MG/DL (ref 8–23)
CALCIUM SERPL-MCNC: 8 MG/DL (ref 8.3–10.4)
CHLORIDE SERPL-SCNC: 108 MMOL/L (ref 98–107)
CO2 SERPL-SCNC: 26 MMOL/L (ref 21–32)
CREAT SERPL-MCNC: 1.22 MG/DL (ref 0.6–1)
ERYTHROCYTE [DISTWIDTH] IN BLOOD BY AUTOMATED COUNT: 19.8 % (ref 11.9–14.6)
GLUCOSE SERPL-MCNC: 196 MG/DL (ref 65–100)
HCT VFR BLD AUTO: 28 % (ref 35.8–46.3)
HGB BLD-MCNC: 8.3 G/DL (ref 11.7–15.4)
MCH RBC QN AUTO: 23.8 PG (ref 26.1–32.9)
MCHC RBC AUTO-ENTMCNC: 29.6 G/DL (ref 31.4–35)
MCV RBC AUTO: 80.2 FL (ref 79.6–97.8)
PLATELET # BLD AUTO: 355 K/UL (ref 150–450)
PMV BLD AUTO: 10.5 FL (ref 10.8–14.1)
POTASSIUM SERPL-SCNC: 4.7 MMOL/L (ref 3.5–5.1)
RBC # BLD AUTO: 3.49 M/UL (ref 4.05–5.25)
SODIUM SERPL-SCNC: 143 MMOL/L (ref 136–145)
VANCOMYCIN TROUGH SERPL-MCNC: 14.7 UG/ML (ref 5–20)
WBC # BLD AUTO: 22.4 K/UL (ref 4.3–11.1)

## 2018-06-06 PROCEDURE — 77030019605

## 2018-06-06 PROCEDURE — 65270000029 HC RM PRIVATE

## 2018-06-06 PROCEDURE — 73100 X-RAY EXAM OF WRIST: CPT

## 2018-06-06 PROCEDURE — 94760 N-INVAS EAR/PLS OXIMETRY 1: CPT

## 2018-06-06 PROCEDURE — 74011250637 HC RX REV CODE- 250/637: Performed by: INTERNAL MEDICINE

## 2018-06-06 PROCEDURE — 74011250636 HC RX REV CODE- 250/636: Performed by: INTERNAL MEDICINE

## 2018-06-06 PROCEDURE — 85027 COMPLETE CBC AUTOMATED: CPT | Performed by: FAMILY MEDICINE

## 2018-06-06 PROCEDURE — 80048 BASIC METABOLIC PNL TOTAL CA: CPT | Performed by: FAMILY MEDICINE

## 2018-06-06 PROCEDURE — 74011000258 HC RX REV CODE- 258: Performed by: INTERNAL MEDICINE

## 2018-06-06 PROCEDURE — 97530 THERAPEUTIC ACTIVITIES: CPT

## 2018-06-06 PROCEDURE — 36415 COLL VENOUS BLD VENIPUNCTURE: CPT | Performed by: FAMILY MEDICINE

## 2018-06-06 PROCEDURE — 77010033678 HC OXYGEN DAILY

## 2018-06-06 PROCEDURE — 80202 ASSAY OF VANCOMYCIN: CPT | Performed by: FAMILY MEDICINE

## 2018-06-06 PROCEDURE — 97110 THERAPEUTIC EXERCISES: CPT

## 2018-06-06 PROCEDURE — 74011250637 HC RX REV CODE- 250/637: Performed by: FAMILY MEDICINE

## 2018-06-06 RX ORDER — DOCUSATE SODIUM 100 MG/1
100 CAPSULE, LIQUID FILLED ORAL 2 TIMES DAILY
Status: DISCONTINUED | OUTPATIENT
Start: 2018-06-06 | End: 2018-06-08 | Stop reason: HOSPADM

## 2018-06-06 RX ORDER — POLYETHYLENE GLYCOL 3350 17 G/17G
17 POWDER, FOR SOLUTION ORAL 2 TIMES DAILY
Status: DISCONTINUED | OUTPATIENT
Start: 2018-06-06 | End: 2018-06-08 | Stop reason: HOSPADM

## 2018-06-06 RX ADMIN — VANCOMYCIN HYDROCHLORIDE 1500 MG: 10 INJECTION, POWDER, LYOPHILIZED, FOR SOLUTION INTRAVENOUS at 16:16

## 2018-06-06 RX ADMIN — CLONAZEPAM 0.5 MG: 0.5 TABLET ORAL at 09:37

## 2018-06-06 RX ADMIN — DOCUSATE SODIUM 100 MG: 100 CAPSULE, LIQUID FILLED ORAL at 11:31

## 2018-06-06 RX ADMIN — ALLOPURINOL 100 MG: 100 TABLET ORAL at 21:07

## 2018-06-06 RX ADMIN — SENNOSIDES AND DOCUSATE SODIUM 1 TABLET: 8.6; 5 TABLET ORAL at 09:37

## 2018-06-06 RX ADMIN — METOPROLOL SUCCINATE 50 MG: 50 TABLET, EXTENDED RELEASE ORAL at 09:38

## 2018-06-06 RX ADMIN — CITALOPRAM HYDROBROMIDE 20 MG: 20 TABLET ORAL at 09:38

## 2018-06-06 RX ADMIN — POTASSIUM CHLORIDE 40 MEQ: 20 TABLET, EXTENDED RELEASE ORAL at 09:37

## 2018-06-06 RX ADMIN — FLUTICASONE PROPIONATE 1 SPRAY: 50 SPRAY, METERED NASAL at 09:00

## 2018-06-06 RX ADMIN — Medication 10 ML: at 21:14

## 2018-06-06 RX ADMIN — FUROSEMIDE 40 MG: 40 TABLET ORAL at 16:17

## 2018-06-06 RX ADMIN — ALLOPURINOL 100 MG: 100 TABLET ORAL at 09:37

## 2018-06-06 RX ADMIN — LORATADINE 10 MG: 10 TABLET ORAL at 09:38

## 2018-06-06 RX ADMIN — Medication 10 ML: at 05:49

## 2018-06-06 RX ADMIN — LISINOPRIL 40 MG: 20 TABLET ORAL at 09:37

## 2018-06-06 RX ADMIN — CLONAZEPAM 0.5 MG: 0.5 TABLET ORAL at 20:01

## 2018-06-06 RX ADMIN — BUPROPION HYDROCHLORIDE 150 MG: 150 TABLET, EXTENDED RELEASE ORAL at 09:37

## 2018-06-06 RX ADMIN — DONEPEZIL HYDROCHLORIDE 10 MG: 5 TABLET, FILM COATED ORAL at 21:07

## 2018-06-06 RX ADMIN — POLYETHYLENE GLYCOL (3350) 17 G: 17 POWDER, FOR SOLUTION ORAL at 11:31

## 2018-06-06 RX ADMIN — FUROSEMIDE 40 MG: 40 TABLET ORAL at 09:38

## 2018-06-06 RX ADMIN — CEFTRIAXONE 1 G: 1 INJECTION, POWDER, FOR SOLUTION INTRAMUSCULAR; INTRAVENOUS at 11:31

## 2018-06-06 RX ADMIN — ASPIRIN 81 MG 81 MG: 81 TABLET ORAL at 09:37

## 2018-06-06 RX ADMIN — PANTOPRAZOLE SODIUM 40 MG: 40 TABLET, DELAYED RELEASE ORAL at 09:37

## 2018-06-06 NOTE — CONSULTS
Pt under my care for R wrist fracture, closed treatment. Admitted for medical care. In [facility] splint. Was scheduled to follow up in my office as outpatient. I have evaluated her and ordered an X-ray. She is a poor surgical candidate. It is doubtful that any X-ray finding will .

## 2018-06-06 NOTE — PROGRESS NOTES
Problem: Falls - Risk of  Goal: *Absence of Falls  Document Richard Fall Risk and appropriate interventions in the flowsheet. Outcome: Progressing Towards Goal  Fall Risk Interventions:  Mobility Interventions: Bed/chair exit alarm, OT consult for ADLs, PT Consult for assist device competence, PT Consult for mobility concerns, Patient to call before getting OOB    Mentation Interventions: Bed/chair exit alarm, Adequate sleep, hydration, pain control    Medication Interventions: Bed/chair exit alarm, Patient to call before getting OOB    Elimination Interventions: Bed/chair exit alarm, Call light in reach    History of Falls Interventions: Bed/chair exit alarm        Problem: Pressure Injury - Risk of  Goal: *Prevention of pressure injury  Document Perico Scale and appropriate interventions in the flowsheet.    Outcome: Progressing Towards Goal  Pressure Injury Interventions:  Sensory Interventions: Assess changes in LOC    Moisture Interventions: Absorbent underpads, Apply protective barrier, creams and emollients    Activity Interventions: Assess need for specialty bed, PT/OT evaluation    Mobility Interventions: Assess need for specialty bed, PT/OT evaluation    Nutrition Interventions: Document food/fluid/supplement intake, Discuss nutritional consult with provider    Friction and Shear Interventions: Apply protective barrier, creams and emollients, HOB 30 degrees or less

## 2018-06-06 NOTE — PROGRESS NOTES
Patient is constipated. Spoke with Dr. Everett Davidson and received orders for Colace BID and Miralax BID.

## 2018-06-06 NOTE — PROGRESS NOTES
Shift assessment complete. Patient with flat affect this am. No interest in eating or speaking with anyone. Respirations present, even and unlabored. She does have diminished breath sounds at the bases. HOB elevated and patient denies any SOB at this time. Heart: S1 & S2 auscultated, heart with regular rate and rhythm. Abdomen distended and tender at the area of her umbilicus. The umbilicus is protruding, hot to the touch and large. Puncture site to right abdomen covered with 2x2 Telfa. Bowel sounds active in all 4 quadrants. 1+ non pitting edema to bilateral upper extremities. 2+ non pitting edema to bilateral lower extremities. Patient's skin assessment shows bruising to the right eye. Ecchymosis present to the right upper arm. Sling and ACE wrap in use. Patient is continent and using bedpan with backup use of briefs for stress incontinence. IV HW and flushes well. She is on a regular diet, but is not interested in eating today. The bed is low and locked in position, side rails x3, call light within reach. Attempted to assist patient with incentive spirometer and she is unable to participate. Will continue to follow patient's progression through hourly rounding. All patient's needs will be met as requested and as appropriate.

## 2018-06-06 NOTE — PROGRESS NOTES
Shift assessment complete. Pt alert and oriented to person and place only, respirations present, even and unlabored, HOB elevated, pt denies any SOB at this time, S1&S2 auscultated, HR regular, abd distended with mass to left side of abdomen, dressing to umbilicus c/d/i, Active BS in all 4 quadrants, No pressure ulcers noted, +1 edema noted in BUE and +2 edema noted in BLE, pt denies any pain at this time, pt instructed to call for assistance, pt verbalizes understanding, bed low and locked, side rails x3, call light within reach, family at bedside.

## 2018-06-06 NOTE — PROGRESS NOTES
Hospitalist Progress Note    2018  Admit Date: 2018 11:04 AM   NAME: Monae Mistry   :  1934   MRN:  [id number]   Attending: India Montalvo MD  PCP:  John Almeida MD    SUBJECTIVE:   Per chart review, patient is an 80yoF who was initially admitted to the hospital on 18 after presenting with her caretaker for \"fatigue. \"  She had a recent fall at home with subsequent R arm fracture with declining condition over the last several days prior to admission. She complained of weakness. Patient found to be anemic on admission with hgb of 8 (with baseline of around 12). Patient has a known history of an ovarian tumor. Dr. Trey Fulton of [facility] has been following the patient and ordered an IR-guided drainage of the cystic portion of this ovarian tumor which was performed on 18 at [facility] by Dr. Justus Otero. She returned to [facility] without incident. Today, patient is accompanied by another caregiver and a medical case manager. The patient has no complaints when asked, but I kindly let her know that she looks sad and on the verge of tears. I ask if she would like to talk about it with me, but she declines and tells me that \"I do not want to talk about it right now. \"  Her medical case manager lets me know that she was previously scheduled to see Dr. Maureen Sainz with Orthopedic Surgery to evaluate whether or not she needed casting sometime this week. I advise that I will consult with Ortho for their evaluation while she is here. By the end of interview/exam, pt does not appear to be tearful and is more interactive. Still denies any current pain/complaints.     Review of Systems negative with exception of pertinent positives noted above  PHYSICAL EXAM     Visit Vitals    /73 (BP 1 Location: Right arm, BP Patient Position: At rest;Supine)    Pulse 70    Temp 98.2 °F (36.8 °C)    Resp 17    Ht 5' 4\" (1.626 m)    Wt 95.8 kg (211 lb 4.8 oz)    SpO2 92%    BMI 36.27 kg/m2 Temp (24hrs), Av.2 °F (36.8 °C), Min:97.8 °F (36.6 °C), Max:98.9 °F (37.2 °C)    Oxygen Therapy  O2 Sat (%): 92 % (18)  Pulse via Oximetry: 71 beats per minute (18)  O2 Device: Nasal cannula (18)  O2 Flow Rate (L/min): 1 l/min (18)  FIO2 (%): 24 % (18 0757)  No intake or output data in the 24 hours ending 18 1208   General: No acute distress    Lungs:  CTA Bilaterally. Heart:  Regular rate and rhythm,  No murmur, rub, or gallop  Abdomen: Soft, Non distended, Non tender, Positive bowel sounds. Bandage over umbilicus.   Extremities: No cyanosis, clubbing or edema  Neurologic:  No focal deficits    Recent Results (from the past 24 hour(s))   CBC W/O DIFF    Collection Time: 18  3:25 PM   Result Value Ref Range    WBC 22.4 (H) 4.3 - 11.1 K/uL    RBC 3.49 (L) 4.05 - 5.25 M/uL    HGB 8.3 (L) 11.7 - 15.4 g/dL    HCT 28.0 (L) 35.8 - 46.3 %    MCV 80.2 79.6 - 97.8 FL    MCH 23.8 (L) 26.1 - 32.9 PG    MCHC 29.6 (L) 31.4 - 35.0 g/dL    RDW 19.8 (H) 11.9 - 14.6 %    PLATELET 043 695 - 652 K/uL    MPV 10.5 (L) 10.8 - 44.2 FL   METABOLIC PANEL, BASIC    Collection Time: 18  3:25 PM   Result Value Ref Range    Sodium 143 136 - 145 mmol/L    Potassium 4.7 3.5 - 5.1 mmol/L    Chloride 108 (H) 98 - 107 mmol/L    CO2 26 21 - 32 mmol/L    Anion gap 9 7 - 16 mmol/L    Glucose 196 (H) 65 - 100 mg/dL    BUN 24 (H) 8 - 23 MG/DL    Creatinine 1.22 (H) 0.6 - 1.0 MG/DL    GFR est AA 54 (L) >60 ml/min/1.73m2    GFR est non-AA 45 (L) >60 ml/min/1.73m2    Calcium 8.0 (L) 8.3 - 10.4 MG/DL   VANCOMYCIN, TROUGH    Collection Time: 18  3:25 PM   Result Value Ref Range    Vancomycin,trough 14.7 5 - 20 ug/mL       ASSESSMENT      Active Hospital Problems    Diagnosis Date Noted    Fatigue 2018    Anemia 2018    Ovarian tumor 2018    Fall at home, subsequent encounter 2018    Ventral hernia without obstruction or gangrene 2018    Essential hypertension, benign 08/19/2015    Pure hypercholesterolemia 08/19/2015     Plan:  Ovarian Tumor  - Unclear of knowledge of possible diagnosis among patient and family at this time. Will defer further conversation to Gynecology-Oncology  - I have suspicion that patient's sadness today may be secondary to this diagnosis, but again, patient declines to talk about it now with me. Anemia  - Hgb stable  - Monitor closely    HTN  - BPs stable  - Continue home meds    Fever  - Currently afebrile  - On empiric abx without source of infection  - Recheck labs as patient does have a leukocytosis    Weakness/Failure to Thrive  - PT  - Encourage PO      DISPO:  Continue supportive care and work-up.   Plan for STR on discharge    Signed By: Ji Reynolds MD     June 6, 2018

## 2018-06-06 NOTE — PROGRESS NOTES
Problem: Mobility Impaired (Adult and Pediatric)  Goal: *Acute Goals and Plan of Care (Insert Text)  1 WEEK GOALS :  (1.)Ms. Padilla will move from supine to sit and sit to supine  with MODERATE ASSIST within 7 treatment day(s). (2.)Ms. Padilla will transfer from sit<>stand with left UE support & moderate assist.   (3.)Ms. Padilla will perform fair sitting static balance for 1-2 min EOB with SBA. 4) pt will perform EOB wt-shifting multiple directions with CGA for 30 sec. 5) pt will perform AROM -AAROM to left UE & both LE's on cue with PT & caregiver to increase purposeful muscle activity. 6) pt will perform EOB scooting with moderate assist.        PHYSICAL THERAPY: Daily Note, Treatment Day: 3rd, PM 6/6/2018  INPATIENT: Hospital Day: 8  [place] / [place] / Product Type: Medicare /      NAME/AGE/GENDER: Ramakrishna Sheehan is a 80 y.o. female   PRIMARY DIAGNOSIS: Anemia  Fatigue Fatigue Fatigue        ICD-10: Treatment Diagnosis:    · Generalized Muscle Weakness (M62.81)  · Other lack of cordination (R27.8)  · History of falling (Z91.81)   Precaution/Allergies:  Gold au 198 and Sulfa (sulfonamide antibiotics)      ASSESSMENT:     Ms. Leno Cuevas is supine upon arrival. Pt requires Max A for bed mobility and Mod A for static sitting balance. Pt requires UE support on R to maintain sitting balance. Pt leans to left and requires verbal/tactile/manual cues for posture. Pt performed seated and supine exercises with no difficulty or increase in pain. Pt left supine with call bell, needs in reach, and care giver at bedside. This section estabbelished at most recent assessment   PROBLEM LIST (Impairments causing functional limitations):  1. Decreased Strength  2. Decreased ADL/Functional Activities  3. Decreased Transfer Abilities  4. Decreased Balance  5. Decreased Activity Tolerance  6. Increased Fatigue  7. Increased Shortness of Breath  8. Decreased Flexibility/Joint Mobility  9.  Decreased [place] with Home Exercise Programal    INTERVENTIONS PLANNED: (Benefits and precautions of physical therapy have been discussed with the patient.)  1. Balance Exercise  2. Bed Mobility  3. Neuromuscular Re-education/Strengthening  4. Therapeutic Activites  5. Therapeutic Exercise/Strengthening  6. Transfer Training     TREATMENT PLAN: Frequency/Duration: daily ( or as indicated ) for duration of hospital stay  Rehabilitation Potential For Stated Goals: Milly Moreno REHABILITATION/EQUIPMENT: (at time of discharge pending progress): Due to the probability of continued deficits (see above) this patient will likely need continued skilled physical therapy after discharge. Equipment:    to be determined              HISTORY:   History of Present Injury/Illness (Reason for Referral): Taran Noble is a 80 y.o. female who was brought to ER by her caretaker for fatigue.      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 No surgery or definite treatment was planned. She also had recent fall at home and had right arm fracture, currently on splint. No surgery was planned due to her overall poor health condition.      Patient was feeling weak for the past few days. No energy. She denies fever. No chills. No shortness of breath. No chest pain.      Patient reports a lot of bruises all over her body and at the right half of her face due to recent fall. No headache now.      In ER, patient was found to have anemia. Her Hb dropped to 8+ from baseline of 12+ recently.      Due to multiple medical problems and possibility of acute blood loss anemia, hospitalist service was asked to see patient. Past Medical History/Comorbidities:   Ms. Nadia Mendez  has a past medical history of CHF (congestive heart failure) ([facility] [address] Coronary atherosclerosis of unspecified type of vessel, native or graft (8/19/2015); Esophageal reflux (8/19/2015);  Gastrointestinal disorder; Gout (2015); Hypertension; Other and unspecified hyperlipidemia (2015); Other ill-defined conditions(799.89); and Pure hypercholesterolemia (2015). Ms. Alfie Lea  has a past surgical history that includes hx cholecystectomy and hx heent. Social History/Living Environment:   Home Environment: Private residence  # Steps to Enter: 1  One/Two Story Residence: Two story, live on 1st floor  Living Alone: No  Support Systems:  ( sitter service)  Patient Expects to be Discharged to[de-identified] Skilled nursing facility  Current DME Used/Available at Home: Walker, rollator  Prior Level of Function/Work/Activity:  Pt was functioning short distances with a Rolator & SBA, pt came supine <> sit & transferred out of a chair with moderate assist prior to this admission. Personal Factors: Other factors that influence how disability is experienced by the patient:  Current & PMH   Number of Personal Factors/Comorbidities that affect the Plan of Care: 3+: HIGH COMPLEXITY   EXAMINATION:   Most Recent Physical Functioning:   Gross Assessment:                       Balance:  Sitting: Impaired; With support  Sitting - Static: Fair (occasional) Bed Mobility:  Supine to Sit: Maximum assistance  Sit to Supine: Maximum assistance  Scooting: Maximum assistance  Duration: 10 Minutes       Transfers:     Gait: NA         Functional Mobility:         Transfers:  max        Bed Mobility:  Max - 2 to TD   Body Structures Involved:  1. Heart  2. Metabolic  3. Muscles Body Functions Affected:  1. Cardio  2. Movement Related  3. Metobolic/Endocrine Activities and Participation Affected:  1. General Tasks and Demands  2.  Mobility   Number of elements that affect the Plan of Care: 4+: HIGH COMPLEXITY   CLINICAL PRESENTATION:   Presentation: Evolving clinical presentation with unstable and unpredictable characteristics: HIGH COMPLEXITY   CLINICAL DECISION MAKIN [facility] Form  How much difficulty does the patient currently have. .. Unable A Lot A Little None   1. Turning over in bed (including adjusting bedclothes, sheets and blankets)? [] 1   [x] 2   [] 3   [] 4   2. Sitting down on and standing up from a chair with arms ( e.g., wheelchair, bedside commode, etc.)   [] 1   [x] 2   [] 3   [] 4   3. Moving from lying on back to sitting on the side of the bed? [] 1   [x] 2   [] 3   [] 4   How much help from another person does the patient currently need. .. Total A Lot A Little None   4. Moving to and from a bed to a chair (including a wheelchair)? [x] 1   [] 2   [] 3   [] 4   5. Need to walk in hospital room? [x] 1   [] 2   [] 3   [] 4   6. Climbing 3-5 steps with a railing? [x] 1   [] 2   [] 3   [] 4   © 2007, Trustees of [address] [place] [address], under license to [employer]. All rights reserved      Score:  Initial: 9 Most Recent: X (Date: -- )    Interpretation of Tool:  Represents activities that are increasingly more difficult (i.e. Bed mobility, Transfers, Gait). Score 24 23 22-20 19-15 14-10 9-7 6     Modifier CH CI CJ CK CL CM CN      ? Mobility - Walking and Moving Around:     - CURRENT STATUS: CM - 80%-99% impaired, limited or restricted    - GOAL STATUS: CL - 60%-79% impaired, limited or restricted    - D/C STATUS:  ---------------To be determined---------------  [place] / [place] / Product Type: Medicare /      Medical Necessity:     · Patient is expected to demonstrate progress in strength, balance, coordination and functional technique to decrease assistance required with bed mobility, sitting balance & transfers. Reason for Services/Other Comments:  · Patient continues to require skilled intervention due to pt not being manageable for caregivers.    Use of outcome tool(s) and clinical judgement create a POC that gives a: Difficult prediction of patient's progress: HIGH COMPLEXITY            TREATMENT:   (In addition to Assessment/Re-Assessment sessions the following treatments were rendered)   Pre-treatment Symptoms/Complaints: pt lethargic but agreeable to PT. Pain: Initial: visual scale  Pain Intensity 1: 0 (before and after treatment)  Post Session:       Therapeutic Activity: (10 Minutes   ):  Therapeutic activities including rolling, supine<>sit, static & dynamic sitting balance (wt shifting EOB R<>L front,>back) repeated sit <>stand, when standing active assisted wt-shift R<>L, pt also performed AAROM to left arm & both LE's to stimulate purposeful movement to improve mobility, strength, balance, coordination and dynamic movement of arm - left and leg - bilateral to improve functional core stability. Therapeutic Exercise: (13 Minutes):  Exercises per grid below to improve mobility, strength, balance and coordination. Required minimal visual, verbal and tactile cues to promote proper body alignment, promote proper body posture and promote proper body mechanics. Progressed repetitions as indicated. Date:  6/6 Date:   Date:     Activity/Exercise Parameters Parameters Parameters   AP 10a     LAQ 10a     Marching 10aa     Supine hip abduction/adduction 10aa                               Braces/Orthotics/Lines/Etc:   · IV  Treatment/Session Assessment:    · Response to Treatment: tolerated sitting on EOB x10 minutes fair. · Interdisciplinary Collaboration:   o Registered Nurse  o Rehabilitation Attendant  o Certified Nursing Assistant/Patient Care Technician  · After treatment position/precautions:   o Supine in bed  o Bed/Chair-wheels locked  o Bed in low position  o Caregiver at bedside  o Call light within reach  o RN notified  o Family at bedside   · Compliance with Program/Exercises: Will assess as treatment progresses. · Recommendations/Intent for next treatment session: \"Next visit will focus on reduction in assistance provided\".   Total Treatment Duration:  PT Patient Time In/Time Out  Time In: 1300  Time Out: [place], [place]

## 2018-06-06 NOTE — PROGRESS NOTES
Vancomycin Consult (Day 5)    MD ordering: Yoel ROY following? no  Indication: sepsis  DOT:  unknown days  Goal level(s): 15-20    Allergies as of 2018 - Review Complete 2018   Allergen Reaction Noted    Gold au 198 Unknown (comments) 2015    Sulfa (sulfonamide antibiotics) Unknown (comments) 2015     Current Antimicrobial Therapy (168h ago through future)      Ordered     Start Stop      18 1546  VANCOMYCIN INFORMATION NOTE  Other,   RX DOSING/MONITORING      18 1500 --    18 1154  vancomycin (VANCOCIN) 1500 mg in  ml infusion  1,500 mg,   IntraVENous,   EVERY 24 HOURS      18 1500 --    18 1154  cefTRIAXone (ROCEPHIN) 1 g in 0.9% sodium chloride (MBP/ADV) 50 mL  1 g,   IntraVENous,   EVERY 24 HOURS      18 1200 --            All Micro Results       Procedure Component Value Units Date/Time      CULTURE, BLOOD [[id number]] Collected:  18 1118    Order Status:  Completed Specimen:  Blood from Blood Updated:  18 0703     Special Requests: --        LEFT  HAND       Culture result: NO GROWTH 4 DAYS       CULTURE, URINE [[id number]] Collected:  18 1721    Order Status:  Completed Specimen:  Urine from Clean catch Updated:  18 0708     Special Requests: NO SPECIAL REQUESTS        Culture result:         <10,000 COLONIES/mL MIXED SKIN CHANNING ISOLATED            Temp (24hrs), Av.1 °F (36.7 °C), Min:97.8 °F (36.6 °C), Max:98.2 °F (36.8 °C)    CrCl ~ 39 ml/min  Dosing weight: 96 kg   80 y.o.     Date:  Dose/Freq Admin Times Level/Time:   18 Vanc 1750 mg IV x 1 dose 1327     6/3/18 Vanc 1500 mg IV q24h 1440     18 Vanc 1500 mg IV q24h 1443     18 Vanc 1500 mg IV q24h 1602 Tr @ 1459 = 14.9   18 Vanc 1500 mg IV q24h 1616     18  Vancomycin 1750 mg IV q24h  Due at 1600          Recent Labs      18   1525   BUN  24*   CREA  1.22*   WBC  22.4*     Estimated Creatinine Clearance: 39.2 mL/min (based on Cr of 1.22). A/P:    Trough level remains slightly below goal.  Based on trough level, changed dose to Vancomycin 1750 mg IV every 24 hours. Next dose is due tomorrow (6/7) at 1600. Pharmacy will continue to follow.     Thank you,     Akila Newell, PharmD

## 2018-06-06 NOTE — PROGRESS NOTES
Problem: Interdisciplinary Rounds  Goal: Interdisciplinary Rounds  Interdisciplinary team rounds were held 6/6/2018 with the following team members:Care Management, Nursing, Nutrition, Pharmacy, Physical Therapy and Physician and the patient. Plan of care discussed. See clinical pathway and/or care plan for interventions and desired outcomes.

## 2018-06-06 NOTE — PROGRESS NOTES
Dr. So Nolan from Ortho to patient's bedside. Family is concerned about the condition of the patient's right arm. Placed order for portable right wrist x-ray per Dr. So Nolan.

## 2018-06-07 LAB
ANION GAP SERPL CALC-SCNC: 9 MMOL/L (ref 7–16)
BACTERIA SPEC CULT: NORMAL
BUN SERPL-MCNC: 23 MG/DL (ref 8–23)
CALCIUM SERPL-MCNC: 8 MG/DL (ref 8.3–10.4)
CHLORIDE SERPL-SCNC: 108 MMOL/L (ref 98–107)
CO2 SERPL-SCNC: 28 MMOL/L (ref 21–32)
CREAT SERPL-MCNC: 1.07 MG/DL (ref 0.6–1)
ERYTHROCYTE [DISTWIDTH] IN BLOOD BY AUTOMATED COUNT: 19.6 % (ref 11.9–14.6)
GLUCOSE SERPL-MCNC: 102 MG/DL (ref 65–100)
HCT VFR BLD AUTO: 26.7 % (ref 35.8–46.3)
HGB BLD-MCNC: 7.8 G/DL (ref 11.7–15.4)
MCH RBC QN AUTO: 23.6 PG (ref 26.1–32.9)
MCHC RBC AUTO-ENTMCNC: 29.2 G/DL (ref 31.4–35)
MCV RBC AUTO: 80.7 FL (ref 79.6–97.8)
PLATELET # BLD AUTO: 380 K/UL (ref 150–450)
PMV BLD AUTO: 10.4 FL (ref 10.8–14.1)
POTASSIUM SERPL-SCNC: 4.3 MMOL/L (ref 3.5–5.1)
RBC # BLD AUTO: 3.31 M/UL (ref 4.05–5.25)
SERVICE CMNT-IMP: NORMAL
SODIUM SERPL-SCNC: 145 MMOL/L (ref 136–145)
WBC # BLD AUTO: 21 K/UL (ref 4.3–11.1)

## 2018-06-07 PROCEDURE — 77010033678 HC OXYGEN DAILY

## 2018-06-07 PROCEDURE — 65270000029 HC RM PRIVATE

## 2018-06-07 PROCEDURE — 80048 BASIC METABOLIC PNL TOTAL CA: CPT | Performed by: FAMILY MEDICINE

## 2018-06-07 PROCEDURE — 94760 N-INVAS EAR/PLS OXIMETRY 1: CPT

## 2018-06-07 PROCEDURE — 74011000258 HC RX REV CODE- 258: Performed by: INTERNAL MEDICINE

## 2018-06-07 PROCEDURE — 74011250637 HC RX REV CODE- 250/637: Performed by: FAMILY MEDICINE

## 2018-06-07 PROCEDURE — 97530 THERAPEUTIC ACTIVITIES: CPT

## 2018-06-07 PROCEDURE — 74011250636 HC RX REV CODE- 250/636: Performed by: INTERNAL MEDICINE

## 2018-06-07 PROCEDURE — 85027 COMPLETE CBC AUTOMATED: CPT | Performed by: FAMILY MEDICINE

## 2018-06-07 PROCEDURE — 97110 THERAPEUTIC EXERCISES: CPT

## 2018-06-07 PROCEDURE — 74011250637 HC RX REV CODE- 250/637: Performed by: INTERNAL MEDICINE

## 2018-06-07 PROCEDURE — 36415 COLL VENOUS BLD VENIPUNCTURE: CPT | Performed by: FAMILY MEDICINE

## 2018-06-07 RX ADMIN — ASPIRIN 81 MG 81 MG: 81 TABLET ORAL at 09:56

## 2018-06-07 RX ADMIN — Medication 5 ML: at 16:41

## 2018-06-07 RX ADMIN — CLONAZEPAM 0.5 MG: 0.5 TABLET ORAL at 09:53

## 2018-06-07 RX ADMIN — CLONAZEPAM 0.5 MG: 0.5 TABLET ORAL at 21:35

## 2018-06-07 RX ADMIN — Medication 10 ML: at 21:49

## 2018-06-07 RX ADMIN — POTASSIUM CHLORIDE 40 MEQ: 20 TABLET, EXTENDED RELEASE ORAL at 09:56

## 2018-06-07 RX ADMIN — POLYETHYLENE GLYCOL (3350) 17 G: 17 POWDER, FOR SOLUTION ORAL at 10:20

## 2018-06-07 RX ADMIN — METOPROLOL SUCCINATE 50 MG: 50 TABLET, EXTENDED RELEASE ORAL at 09:57

## 2018-06-07 RX ADMIN — FLUTICASONE PROPIONATE 1 SPRAY: 50 SPRAY, METERED NASAL at 10:18

## 2018-06-07 RX ADMIN — BUPROPION HYDROCHLORIDE 150 MG: 150 TABLET, EXTENDED RELEASE ORAL at 09:56

## 2018-06-07 RX ADMIN — CEFTRIAXONE 1 G: 1 INJECTION, POWDER, FOR SOLUTION INTRAMUSCULAR; INTRAVENOUS at 13:52

## 2018-06-07 RX ADMIN — Medication 5 ML: at 05:19

## 2018-06-07 RX ADMIN — FUROSEMIDE 40 MG: 40 TABLET ORAL at 18:49

## 2018-06-07 RX ADMIN — HYDROCODONE BITARTRATE AND ACETAMINOPHEN 1 TABLET: 5; 325 TABLET ORAL at 17:03

## 2018-06-07 RX ADMIN — PANTOPRAZOLE SODIUM 40 MG: 40 TABLET, DELAYED RELEASE ORAL at 09:53

## 2018-06-07 RX ADMIN — DOCUSATE SODIUM 100 MG: 100 CAPSULE, LIQUID FILLED ORAL at 09:57

## 2018-06-07 RX ADMIN — LORATADINE 10 MG: 10 TABLET ORAL at 09:57

## 2018-06-07 RX ADMIN — ALLOPURINOL 100 MG: 100 TABLET ORAL at 09:56

## 2018-06-07 RX ADMIN — SENNOSIDES AND DOCUSATE SODIUM 1 TABLET: 8.6; 5 TABLET ORAL at 10:19

## 2018-06-07 RX ADMIN — LISINOPRIL 40 MG: 20 TABLET ORAL at 09:57

## 2018-06-07 RX ADMIN — FUROSEMIDE 40 MG: 40 TABLET ORAL at 09:53

## 2018-06-07 RX ADMIN — ALLOPURINOL 100 MG: 100 TABLET ORAL at 21:35

## 2018-06-07 RX ADMIN — DONEPEZIL HYDROCHLORIDE 10 MG: 5 TABLET, FILM COATED ORAL at 21:35

## 2018-06-07 RX ADMIN — POLYETHYLENE GLYCOL (3350) 17 G: 17 POWDER, FOR SOLUTION ORAL at 18:53

## 2018-06-07 RX ADMIN — VANCOMYCIN HYDROCHLORIDE 1500 MG: 10 INJECTION, POWDER, LYOPHILIZED, FOR SOLUTION INTRAVENOUS at 16:40

## 2018-06-07 RX ADMIN — CITALOPRAM HYDROBROMIDE 20 MG: 20 TABLET ORAL at 09:56

## 2018-06-07 NOTE — PROGRESS NOTES
Gyn onc  Rasta Hanna  06/06/18  [id number]    Patient seen and examined resting comfortably. Gen: WDWN, NAD  Abdomen: soft, NTTP  Ext: some edema bilaterally. S/p attempted ovarian cyst drainage- previously with atypical cells but no clearly malignant cells. Briefly discussed findings with patient and caretaker. Will return tomorrow to discuss further.

## 2018-06-07 NOTE — PROGRESS NOTES
Hospitalist Progress Note    2018  Admit Date: 2018 11:04 AM   NAME: Richard Randle   :  1934   MRN:  [id number]   Attending: Suzette Roberson MD  PCP:  Chiara Lee MD    SUBJECTIVE:   Patient has no complaints during my examination. Tells me that she \"feels fine right now. \"    Had extended meeting with patient's daughter, Corine Zamudio, this afternoon around [phone]. Expresses likelihood of pursuing more \"comfort measures\" for her mother given her medical history and condition. Was called back to bedside at 1700 to speak with patient's son, MICHELLE, as he arrived to the floor and wished to speak with Dr. Camilo Lagunas and myself. He expressed concerns about \"miscommunication\" but after lengthy, interdisciplinary discussion, he also agrees to pursue rehabilitation with no further work-up of this ovarian mass. Review of Systems negative with exception of pertinent positives noted above  PHYSICAL EXAM     Visit Vitals    /65 (BP 1 Location: Left arm, BP Patient Position: At rest;Head of bed elevated (Comment degrees))    Pulse 95    Temp 98.3 °F (36.8 °C)    Resp 18    Ht 5' 4\" (1.626 m)    Wt 95.8 kg (211 lb 4.8 oz)    SpO2 94%    BMI 36.27 kg/m2      Temp (24hrs), Av.8 °F (36.6 °C), Min:96.4 °F (35.8 °C), Max:98.3 °F (36.8 °C)    Oxygen Therapy  O2 Sat (%): 94 % (18 1530)  Pulse via Oximetry: 67 beats per minute (18 0800)  O2 Device: Nasal cannula (18 0800)  O2 Flow Rate (L/min): 1 l/min (18 0800)  FIO2 (%): 24 % (18 0757)  No intake or output data in the 24 hours ending 18 1655   General: No acute distress    Lungs:  CTA Bilaterally. Heart:  Regular rate and rhythm,  No murmur, rub, or gallop  Abdomen: Soft, Non distended, Non tender, Positive bowel sounds. Bandage over umbilicus.   Extremities: No cyanosis, clubbing or edema  Neurologic:  No focal deficits    Recent Results (from the past 24 hour(s))   CBC W/O DIFF    Collection Time: 18 7:06 AM   Result Value Ref Range    WBC 21.0 (H) 4.3 - 11.1 K/uL    RBC 3.31 (L) 4.05 - 5.25 M/uL    HGB 7.8 (L) 11.7 - 15.4 g/dL    HCT 26.7 (L) 35.8 - 46.3 %    MCV 80.7 79.6 - 97.8 FL    MCH 23.6 (L) 26.1 - 32.9 PG    MCHC 29.2 (L) 31.4 - 35.0 g/dL    RDW 19.6 (H) 11.9 - 14.6 %    PLATELET 095 304 - 869 K/uL    MPV 10.4 (L) 10.8 - 15.0 FL   METABOLIC PANEL, BASIC    Collection Time: 06/07/18  7:06 AM   Result Value Ref Range    Sodium 145 136 - 145 mmol/L    Potassium 4.3 3.5 - 5.1 mmol/L    Chloride 108 (H) 98 - 107 mmol/L    CO2 28 21 - 32 mmol/L    Anion gap 9 7 - 16 mmol/L    Glucose 102 (H) 65 - 100 mg/dL    BUN 23 8 - 23 MG/DL    Creatinine 1.07 (H) 0.6 - 1.0 MG/DL    GFR est AA >60 >60 ml/min/1.73m2    GFR est non-AA 52 (L) >60 ml/min/1.73m2    Calcium 8.0 (L) 8.3 - 10.4 MG/DL       ASSESSMENT      Active Hospital Problems    Diagnosis Date Noted    Fatigue 05/30/2018    Anemia 05/30/2018    Ovarian tumor 05/30/2018    Fall at home, subsequent encounter 05/30/2018    Ventral hernia without obstruction or gangrene 04/13/2018    Essential hypertension, benign 08/19/2015    Pure hypercholesterolemia 08/19/2015     Plan:  Ovarian Tumor  - No further work-up at this time  - Patient defers to son and daughter for Asael Bowman Setting evaluation and discussion with patient and family    Anemia  - Hgb stable  - Monitor closely    HTN  - BPs stable  - Continue home meds    Fever  - Currently afebrile  - On empiric abx without source of infection  - Supportive care    Weakness/Failure to Thrive  - PT  - Encourage PO      DISPO:  Plan to discharge to STR facility tomorrow if medically stable. Plan for patient to return home with family/caretakers after completion of rehab.     Signed By: Austen Yarbrough MD     June 7, 2018

## 2018-06-07 NOTE — PROGRESS NOTES
HOB elevated, no acute distress, abd krystal-soft, large visual mass left quadrant,  tenderness, without drainage, sitter at bedside, neurovascular checks WDL, no complaints of calf pain.

## 2018-06-07 NOTE — PROGRESS NOTES
Problem: Mobility Impaired (Adult and Pediatric)  Goal: *Acute Goals and Plan of Care (Insert Text)  1 WEEK GOALS :  (1.)Ms. Padilla will move from supine to sit and sit to supine  with MODERATE ASSIST within 7 treatment day(s). (2.)Ms. Padilla will transfer from sit<>stand with left UE support & moderate assist.   (3.)Ms. Padilla will perform fair sitting static balance for 1-2 min EOB with SBA. 4) pt will perform EOB wt-shifting multiple directions with CGA for 30 sec. 5) pt will perform AROM -AAROM to left UE & both LE's on cue with PT & caregiver to increase purposeful muscle activity. 6) pt will perform EOB scooting with moderate assist.        PHYSICAL THERAPY: Daily Note, Treatment Day: 4th, PM 6/7/2018  INPATIENT: Hospital Day: 9  [place] / [place] / Product Type: Medicare /      NAME/AGE/GENDER: Nieves Siemens is a 80 y.o. female   PRIMARY DIAGNOSIS: Anemia  Fatigue Fatigue Fatigue        ICD-10: Treatment Diagnosis:    · Generalized Muscle Weakness (M62.81)  · Other lack of cordination (R27.8)  · History of falling (Z91.81)   Precaution/Allergies:  Gold au 198 and Sulfa (sulfonamide antibiotics)      ASSESSMENT:     Ms. Marti Cleary is supine upon arrival. Pt requires Max A of 2 for bed mobility and stand by assist to minimal assist for static sitting balance-progressing from yesterday. Pt fatigued quickly and did not want to sit up very long. In supine worked on some lower extremity exercises with verbal and manual cues. Pt remained supine with needs in reach and sitter at bedside. Hope to further progress tomorrow. This section estabbelished at most recent assessment   PROBLEM LIST (Impairments causing functional limitations):  1. Decreased Strength  2. Decreased ADL/Functional Activities  3. Decreased Transfer Abilities  4. Decreased Balance  5. Decreased Activity Tolerance  6. Increased Fatigue  7. Increased Shortness of Breath  8.  Decreased Flexibility/Joint Mobility  9. Decreased [place] with Home Exercise Programal    INTERVENTIONS PLANNED: (Benefits and precautions of physical therapy have been discussed with the patient.)  1. Balance Exercise  2. Bed Mobility  3. Neuromuscular Re-education/Strengthening  4. Therapeutic Activites  5. Therapeutic Exercise/Strengthening  6. Transfer Training     TREATMENT PLAN: Frequency/Duration: daily ( or as indicated ) for duration of hospital stay  Rehabilitation Potential For Stated Goals: [facility] REHABILITATION/EQUIPMENT: (at time of discharge pending progress): Due to the probability of continued deficits (see above) this patient will likely need continued skilled physical therapy after discharge. Equipment:    to be determined              HISTORY:   History of Present Injury/Illness (Reason for Referral): Elaina Jackson is a 80 y.o. female who was brought to ER by her caretaker for fatigue.      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 No surgery or definite treatment was planned. She also had recent fall at home and had right arm fracture, currently on splint. No surgery was planned due to her overall poor health condition.      Patient was feeling weak for the past few days. No energy. She denies fever. No chills. No shortness of breath. No chest pain.      Patient reports a lot of bruises all over her body and at the right half of her face due to recent fall. No headache now.      In ER, patient was found to have anemia. Her Hb dropped to 8+ from baseline of 12+ recently.      Due to multiple medical problems and possibility of acute blood loss anemia, hospitalist service was asked to see patient. Past Medical History/Comorbidities:   Ms. Swathi Olvera  has a past medical history of CHF (congestive heart failure) ([facility] [address] Coronary atherosclerosis of unspecified type of vessel, native or graft (8/19/2015);  Esophageal reflux (8/19/2015); Gastrointestinal disorder; Gout (8/19/2015); Hypertension; Other and unspecified hyperlipidemia (8/19/2015); Other ill-defined conditions(799.89); and Pure hypercholesterolemia (8/19/2015). Ms. Swathi Olvera  has a past surgical history that includes hx cholecystectomy and hx heent. Social History/Living Environment:   Home Environment: Private residence  # Steps to Enter: 1  One/Two Story Residence: Two story, live on 1st floor  Living Alone: No  Support Systems:  (24/7 sitter service)  Patient Expects to be Discharged to[de-identified] Skilled nursing facility  Current DME Used/Available at Home: Walker, rollator  Prior Level of Function/Work/Activity:  Pt was functioning short distances with a Rolator & SBA, pt came supine <> sit & transferred out of a chair with moderate assist prior to this admission. Personal Factors: Other factors that influence how disability is experienced by the patient:  Current & PMH   Number of Personal Factors/Comorbidities that affect the Plan of Care: 3+: HIGH COMPLEXITY   EXAMINATION:   Most Recent Physical Functioning:   Gross Assessment:                       Balance:  Sitting: Impaired; With support  Sitting - Static: Fair (occasional)  Sitting - Dynamic: Poor (constant support) Bed Mobility:  Supine to Sit: Maximum assistance;Assist x2  Sit to Supine: Maximum assistance;Assist x2  Scooting: Maximum assistance;Assist x2  Duration: 15 Minutes       Transfers:     Gait: NA         Functional Mobility:         Transfers:  max        Bed Mobility:  Max - 2 to TD   Body Structures Involved:  1. Heart  2. Metabolic  3. Muscles Body Functions Affected:  1. Cardio  2. Movement Related  3. Metobolic/Endocrine Activities and Participation Affected:  1. General Tasks and Demands  2.  Mobility   Number of elements that affect the Plan of Care: 4+: HIGH COMPLEXITY   CLINICAL PRESENTATION:   Presentation: Evolving clinical presentation with unstable and unpredictable characteristics: HIGH COMPLEXITY CLINICAL DECISION MAKIN[address] [place] [id number] AM-PAC 6 Clicks   Basic Mobility Inpatient Short Form  How much difficulty does the patient currently have. .. Unable A Lot A Little None   1. Turning over in bed (including adjusting bedclothes, sheets and blankets)? [] 1   [x] 2   [] 3   [] 4   2. Sitting down on and standing up from a chair with arms ( e.g., wheelchair, bedside commode, etc.)   [] 1   [x] 2   [] 3   [] 4   3. Moving from lying on back to sitting on the side of the bed? [] 1   [x] 2   [] 3   [] 4   How much help from another person does the patient currently need. .. Total A Lot A Little None   4. Moving to and from a bed to a chair (including a wheelchair)? [x] 1   [] 2   [] 3   [] 4   5. Need to walk in hospital room? [x] 1   [] 2   [] 3   [] 4   6. Climbing 3-5 steps with a railing? [x] 1   [] 2   [] 3   [] 4   © 2007, Trustees of [address] [place] [address] [address] [address], under license to "[employer]. All rights reserved      Score:  Initial: 9 Most Recent: X (Date: -- )    Interpretation of Tool:  Represents activities that are increasingly more difficult (i.e. Bed mobility, Transfers, Gait). Score 24 23 22-20 19-15 14-10 9-7 6     Modifier CH CI CJ CK CL CM CN      ? Mobility - Walking and Moving Around:     - CURRENT STATUS: CM - 80%-99% impaired, limited or restricted    - GOAL STATUS: CL - 60%-79% impaired, limited or restricted    - D/C STATUS:  ---------------To be determined---------------  [place] / [place] / Product Type: Medicare /      Medical Necessity:     · Patient is expected to demonstrate progress in strength, balance, coordination and functional technique to decrease assistance required with bed mobility, sitting balance & transfers. Reason for Services/Other Comments:  · Patient continues to require skilled intervention due to pt not being manageable for caregivers.    Use of outcome tool(s) and clinical judgement create a POC that gives a: Difficult prediction of patient's progress: HIGH COMPLEXITY            TREATMENT:   (In addition to Assessment/Re-Assessment sessions the following treatments were rendered)   Pre-treatment Symptoms/Complaints: pt lethargic but agreeable to PT. Pain: Initial: 0/10    no pain at rest but some pain with movement Post Session:  0/10     Therapeutic Activity: (15 Minutes   ):  Therapeutic activities including rolling, supine<>sit, static & dynamic sitting balance (wt shifting EOB R<>L front,>back) repeated sit <>stand, when standing active assisted wt-shift R<>L, pt also performed AAROM to left arm & both LE's to stimulate purposeful movement to improve mobility, strength, balance, coordination and dynamic movement of arm - left and leg - bilateral to improve functional core stability. Therapeutic Exercise: (10 Minutes):  Exercises per grid below to improve mobility, strength, balance and coordination. Required minimal visual, verbal and tactile cues to promote proper body alignment, promote proper body posture and promote proper body mechanics. Progressed repetitions as indicated. Date:  6/6 Date:  6/7/18 Date:     Activity/Exercise Parameters Parameters Parameters   AP 10a 10    LAQ 10a     Marching 10aa     Supine hip abduction/adduction 10aa 7aa    Heel slides  7 aa    Straight leg raises  7 aa                  Braces/Orthotics/Lines/Etc:   · IV  Treatment/Session Assessment:    · Response to Treatment: tolerated sitting on EOB x10 minutes-fatigued quickly  · Interdisciplinary Collaboration:   o Registered Nurse  o Rehabilitation Attendant  o Certified Nursing Assistant/Patient Care Technician  · After treatment position/precautions:   o Supine in bed  o Bed/Chair-wheels locked  o Bed in low position  o Caregiver at bedside  o Call light within reach  o Family at bedside   · Compliance with Program/Exercises: Will assess as treatment progresses.   · Recommendations/Intent for next treatment session: \"Next visit will focus on reduction in assistance provided\".   Total Treatment Duration:  PT Patient Time In/Time Out  Time In: 1410  Time Out: 948 Petr Byrd, PT

## 2018-06-07 NOTE — PROGRESS NOTES
Problem: Falls - Risk of  Goal: *Absence of Falls  Document Richard Fall Risk and appropriate interventions in the flowsheet. Outcome: Progressing Towards Goal  Fall Risk Interventions:  Mobility Interventions: Communicate number of staff needed for ambulation/transfer, PT Consult for mobility concerns, PT Consult for assist device competence, Patient to call before getting OOB, Strengthening exercises (ROM-active/passive), Assess mobility with egress test, Bed/chair exit alarm    Mentation Interventions: Adequate sleep, hydration, pain control, Bed/chair exit alarm, Door open when patient unattended, Familiar objects from home, Family/sitter at bedside, More frequent rounding, Room close to nurse's station    Medication Interventions: Patient to call before getting OOB, Bed/chair exit alarm, Assess postural VS orthostatic hypotension    Elimination Interventions: Bed/chair exit alarm, Call light in reach, Patient to call for help with toileting needs, Toilet paper/wipes in reach, Toileting schedule/hourly rounds    History of Falls Interventions: Investigate reason for fall, Evaluate medications/consider consulting pharmacy, Bed/chair exit alarm, Door open when patient unattended, Room close to nurse's station        Problem: Pressure Injury - Risk of  Goal: *Prevention of pressure injury  Document Perico Scale and appropriate interventions in the flowsheet. Outcome: Progressing Towards Goal  Pressure Injury Interventions:  Sensory Interventions: Assess changes in LOC, Assess need for specialty bed, Check visual cues for pain, Discuss PT/OT consult with provider, Turn and reposition approx.  every two hours (pillows and wedges if needed)    Moisture Interventions: Absorbent underpads, Apply protective barrier, creams and emollients, Assess need for specialty bed, Check for incontinence Q2 hours and as needed, Maintain skin hydration (lotion/cream), Limit adult briefs, Moisture barrier    Activity Interventions: Assess need for specialty bed, Pressure redistribution bed/mattress(bed type), PT/OT evaluation    Mobility Interventions: Assess need for specialty bed, Pressure redistribution bed/mattress (bed type), PT/OT evaluation, Turn and reposition approx.  every two hours(pillow and wedges)    Nutrition Interventions: Document food/fluid/supplement intake, Discuss nutritional consult with provider, Offer support with meals,snacks and hydration    Friction and Shear Interventions: Apply protective barrier, creams and emollients, Feet elevated on foot rest, Foam dressings/transparent film/skin sealants, HOB 30 degrees or less, Lift sheet, Minimize layers

## 2018-06-07 NOTE — PROGRESS NOTES
06/07/18 0800   Oxygen Therapy   O2 Sat (%) 91 %   Pulse via Oximetry 67 beats per minute   O2 Device Nasal cannula   O2 Flow Rate (L/min) 1 l/min

## 2018-06-07 NOTE — PROGRESS NOTES
Shift assessment complete. Pt alert and oriented to person and place only, respirations present, even and unlabored, HOB elevated, pt denies any SOB at this time, S1&S2 auscultated, HR regular, abd distended with mass to left side of abdomen, small incision to umbilicus c/d/i, hyopactive BS in all 4 quadrants, No pressure ulcers noted, +1 edema noted in BUE and +1 edema noted in BLE, pt denies any pain at this time, pt instructed to call for assistance, pt verbalizes understanding, bed low and locked, side rails x3, call light within reach, family at bedside.

## 2018-06-08 ENCOUNTER — APPOINTMENT (OUTPATIENT)
Dept: GENERAL RADIOLOGY | Age: 83
DRG: 688 | End: 2018-06-08
Attending: FAMILY MEDICINE
Payer: MEDICARE

## 2018-06-08 VITALS
WEIGHT: 211.42 LBS | DIASTOLIC BLOOD PRESSURE: 51 MMHG | BODY MASS INDEX: 36.09 KG/M2 | HEART RATE: 60 BPM | RESPIRATION RATE: 18 BRPM | HEIGHT: 64 IN | OXYGEN SATURATION: 96 % | TEMPERATURE: 97.4 F | SYSTOLIC BLOOD PRESSURE: 116 MMHG

## 2018-06-08 LAB
ANION GAP SERPL CALC-SCNC: 13 MMOL/L (ref 7–16)
APPEARANCE UR: ABNORMAL
BACTERIA URNS QL MICRO: ABNORMAL /HPF
BILIRUB UR QL: NEGATIVE
BUN SERPL-MCNC: 36 MG/DL (ref 8–23)
CALCIUM SERPL-MCNC: 8.1 MG/DL (ref 8.3–10.4)
CHLORIDE SERPL-SCNC: 107 MMOL/L (ref 98–107)
CO2 SERPL-SCNC: 23 MMOL/L (ref 21–32)
COLOR UR: YELLOW
CREAT SERPL-MCNC: 2.25 MG/DL (ref 0.6–1)
EPI CELLS #/AREA URNS HPF: ABNORMAL /HPF
ERYTHROCYTE [DISTWIDTH] IN BLOOD BY AUTOMATED COUNT: 19.7 % (ref 11.9–14.6)
GLUCOSE SERPL-MCNC: 116 MG/DL (ref 65–100)
GLUCOSE UR STRIP.AUTO-MCNC: NEGATIVE MG/DL
HCT VFR BLD AUTO: 25.7 % (ref 35.8–46.3)
HGB BLD-MCNC: 7.5 G/DL (ref 11.7–15.4)
HGB UR QL STRIP: NEGATIVE
KETONES UR QL STRIP.AUTO: NEGATIVE MG/DL
LACTATE SERPL-SCNC: 4.6 MMOL/L (ref 0.4–2)
LEUKOCYTE ESTERASE UR QL STRIP.AUTO: NEGATIVE
MCH RBC QN AUTO: 23.7 PG (ref 26.1–32.9)
MCHC RBC AUTO-ENTMCNC: 29.2 G/DL (ref 31.4–35)
MCV RBC AUTO: 81.1 FL (ref 79.6–97.8)
MUCOUS THREADS URNS QL MICRO: ABNORMAL /LPF
NITRITE UR QL STRIP.AUTO: NEGATIVE
PH UR STRIP: 5.5 [PH] (ref 5–9)
PLATELET # BLD AUTO: 257 K/UL (ref 150–450)
PMV BLD AUTO: 10.6 FL (ref 10.8–14.1)
POTASSIUM SERPL-SCNC: 5.3 MMOL/L (ref 3.5–5.1)
PROT UR STRIP-MCNC: ABNORMAL MG/DL
RBC # BLD AUTO: 3.17 M/UL (ref 4.05–5.25)
RBC #/AREA URNS HPF: ABNORMAL /HPF
SODIUM SERPL-SCNC: 143 MMOL/L (ref 136–145)
SP GR UR REFRACTOMETRY: 1.01 (ref 1–1.02)
UROBILINOGEN UR QL STRIP.AUTO: 0.2 EU/DL (ref 0.2–1)
WBC # BLD AUTO: 37.5 K/UL (ref 4.3–11.1)
WBC URNS QL MICRO: ABNORMAL /HPF

## 2018-06-08 PROCEDURE — 81003 URINALYSIS AUTO W/O SCOPE: CPT | Performed by: FAMILY MEDICINE

## 2018-06-08 PROCEDURE — 36415 COLL VENOUS BLD VENIPUNCTURE: CPT | Performed by: FAMILY MEDICINE

## 2018-06-08 PROCEDURE — 87040 BLOOD CULTURE FOR BACTERIA: CPT | Performed by: FAMILY MEDICINE

## 2018-06-08 PROCEDURE — 74011250636 HC RX REV CODE- 250/636: Performed by: INTERNAL MEDICINE

## 2018-06-08 PROCEDURE — 74011250637 HC RX REV CODE- 250/637: Performed by: INTERNAL MEDICINE

## 2018-06-08 PROCEDURE — 83605 ASSAY OF LACTIC ACID: CPT | Performed by: FAMILY MEDICINE

## 2018-06-08 PROCEDURE — 74011250636 HC RX REV CODE- 250/636: Performed by: FAMILY MEDICINE

## 2018-06-08 PROCEDURE — 77010033678 HC OXYGEN DAILY

## 2018-06-08 PROCEDURE — 85027 COMPLETE CBC AUTOMATED: CPT | Performed by: FAMILY MEDICINE

## 2018-06-08 PROCEDURE — 80048 BASIC METABOLIC PNL TOTAL CA: CPT | Performed by: FAMILY MEDICINE

## 2018-06-08 PROCEDURE — 74011000258 HC RX REV CODE- 258: Performed by: INTERNAL MEDICINE

## 2018-06-08 PROCEDURE — 94760 N-INVAS EAR/PLS OXIMETRY 1: CPT

## 2018-06-08 PROCEDURE — 77030020256 HC SOL INJ NACL 0.9%  500ML

## 2018-06-08 RX ORDER — CLONAZEPAM 0.5 MG/1
0.5 TABLET ORAL 2 TIMES DAILY
Qty: 10 TAB | Refills: 0 | Status: SHIPPED | OUTPATIENT
Start: 2018-06-08

## 2018-06-08 RX ORDER — SODIUM CHLORIDE 9 MG/ML
125 INJECTION, SOLUTION INTRAVENOUS CONTINUOUS
Status: DISCONTINUED | OUTPATIENT
Start: 2018-06-08 | End: 2018-06-08 | Stop reason: HOSPADM

## 2018-06-08 RX ORDER — HYDROCODONE BITARTRATE AND ACETAMINOPHEN 5; 325 MG/1; MG/1
1 TABLET ORAL
Qty: 20 TAB | Refills: 0 | Status: SHIPPED | OUTPATIENT
Start: 2018-06-08

## 2018-06-08 RX ADMIN — PANTOPRAZOLE SODIUM 40 MG: 40 TABLET, DELAYED RELEASE ORAL at 09:53

## 2018-06-08 RX ADMIN — BUPROPION HYDROCHLORIDE 150 MG: 150 TABLET, EXTENDED RELEASE ORAL at 09:52

## 2018-06-08 RX ADMIN — CEFTRIAXONE 1 G: 1 INJECTION, POWDER, FOR SOLUTION INTRAMUSCULAR; INTRAVENOUS at 12:48

## 2018-06-08 RX ADMIN — SODIUM CHLORIDE 125 ML/HR: 900 INJECTION, SOLUTION INTRAVENOUS at 12:48

## 2018-06-08 RX ADMIN — LORATADINE 10 MG: 10 TABLET ORAL at 09:53

## 2018-06-08 RX ADMIN — CLONAZEPAM 0.5 MG: 0.5 TABLET ORAL at 09:52

## 2018-06-08 RX ADMIN — Medication 10 ML: at 06:13

## 2018-06-08 RX ADMIN — LISINOPRIL 40 MG: 20 TABLET ORAL at 09:53

## 2018-06-08 RX ADMIN — ASPIRIN 81 MG 81 MG: 81 TABLET ORAL at 09:53

## 2018-06-08 RX ADMIN — ALLOPURINOL 100 MG: 100 TABLET ORAL at 09:52

## 2018-06-08 RX ADMIN — CITALOPRAM HYDROBROMIDE 20 MG: 20 TABLET ORAL at 09:52

## 2018-06-08 RX ADMIN — FLUTICASONE PROPIONATE 1 SPRAY: 50 SPRAY, METERED NASAL at 09:53

## 2018-06-08 RX ADMIN — METOPROLOL SUCCINATE 50 MG: 50 TABLET, EXTENDED RELEASE ORAL at 09:53

## 2018-06-08 NOTE — DISCHARGE SUMMARY
Hospitalist Discharge Summary     Patient ID:  Taran Noble  [id number]  56 y.o.  1934  Admit date: 5/30/2018 11:04 AM  Discharge date and time: 6/8/2018  Attending: Fernandez Santamaria MD  PCP:  Carina Lopez MD  Treatment Team: Attending Provider: Fernandez Santamaria MD; Consulting Provider: Amanda Clemons MD; Consulting Provider: Gavino Vicente MD; Utilization Review: Mariano Yang; Care Manager: Jyoti Callahan    Principal Diagnosis Fatigue   Principal Problem:    Fatigue (5/30/2018)    Active Problems:    Essential hypertension, benign (8/19/2015)      Pure hypercholesterolemia (8/19/2015)      Ventral hernia without obstruction or gangrene (4/13/2018)      Anemia (5/30/2018)      Ovarian tumor (5/30/2018)      Fall at home, subsequent encounter (5/30/2018)             Hospital Course:  Please refer to the admission H&P for details of presentation. In summary, the patient is an 83yoF who presented with weakness. She had recently suffered a R wrist fracture and was at home recuperating when family noticed that her overall condition was declining. She was brought into the hospital for further work-up. During her stay, she had a fever of unknown origin, and was started on vancomycin and rocephin. She was continued on this throughout her hospitalization. She has been afebrile for >48 hours. Antibiotics will be discontinued on discharge. Patient has a known diagnosis of ovarian mass. Dr. Byron Hua, her outpatient gynecologist-oncologist, was consulted and followed the patient throughout her stay. He arranged for aspiration of cystic component of mass. Patient tolerated the procedure well.   After lengthy and detailed conversation with patient and family (son and daughter), decision was made to not pursue surgical interventions as patient would likely not be able to recover from surgery in a meaningful way and would likely not survive surgery, as per son, Cardiology has evaluated patient and told family not to pursue surgery as \"it would kill her. \"    Decision was made to pursue STR to see if she could increase her strength. It is imperative that therapy be very gradual and slow, per son's request as he is concerned as his aunt \" in rehab\" after therapy was initiated too quickly. Given patient's poor overall condition and poor overall prognosis, gradual therapy is recommended. If patient cannot be rehabilitated, patient's family wish for her to return home as they will provide 24 hour care. They expressed interest in home hospice if needed, which would be arranged at that time by their outpatient care team.    On day of discharge, patient had multiple lab abnormalities, including abrupt increase in WBC, lactic acid and worsening of creatinine. She clinically appeared the same as she had over the last several days. She denied any fevers, chills, nausea, vomiting, chest pain, SOB, abdominal pain. Said that she only still feels weak. I discussed lab abnormalities with the patient as well as her home nurse. Patient deferred medical planning decisions to her son. I called the patient's son, Mr. Liz Cruz, and had detailed conversation with him about lab findings and potential work-up. As it is more likely that these lab abnormalities are related to intraabdominal process (related to tumor) rather than an infectious process (especially as patient has been on vancomycin and rocephin throughout her stay). I discussed this likelihood with patient's son, and he voiced understanding. He agrees to continue to pursue plan as outlined after family meeting yesterday, and transition patient to STR to help with weakness, before plan to return home for comfort care. Patient would not benefit from further work-up, as if a surgical process would be found, patient is not a surgical candidate.     Patient remains clinically medically stable for discharge, per family's request.    Significant Diagnostic Studies:       Labs: Results:       Chemistry Recent Labs      06/08/18   0650  06/07/18   0706  06/06/18   1525   GLU  116*  102*  196*   NA  143  145  143   K  5.3*  4.3  4.7   CL  107  108*  108*   CO2  23  28  26   BUN  36*  23  24*   CREA  2.25*  1.07*  1.22*   CA  8.1*  8.0*  8.0*   AGAP  13  9  9      CBC w/Diff Recent Labs      06/08/18   0650  06/07/18   0706  06/06/18   1525   WBC  37.5*  21.0*  22.4*   RBC  3.17*  3.31*  3.49*   HGB  7.5*  7.8*  8.3*   HCT  25.7*  26.7*  28.0*   PLT  257  380  355      Cardiac Enzymes No results for input(s): CPK, CKND1, LINDA in the last 72 hours. No lab exists for component: CKRMB, TROIP   Coagulation No results for input(s): PTP, INR, APTT in the last 72 hours. No lab exists for component: INREXT    Lipid Panel Lab Results   Component Value Date/Time    Cholesterol, total 234 (H) 05/09/2016 02:58 PM    HDL Cholesterol 39 (L) 05/09/2016 02:58 PM    LDL, calculated 126 (H) 05/09/2016 02:58 PM    VLDL, calculated 69 (H) 05/09/2016 02:58 PM    Triglyceride 347 (H) 05/09/2016 02:58 PM      BNP No results for input(s): BNPP in the last 72 hours. Liver Enzymes No results for input(s): TP, ALB, TBIL, AP, SGOT, GPT in the last 72 hours. No lab exists for component: DBIL   Thyroid Studies No results found for: T4, T3U, TSH, TSHEXT         Discharge Exam:  Visit Vitals    /51 (BP 1 Location: Left arm, BP Patient Position: At rest)    Pulse 60    Temp 97.4 °F (36.3 °C)    Resp 18    Ht 5' 4\" (1.626 m)    Wt 95.9 kg (211 lb 6.7 oz)    SpO2 96%    BMI 36.29 kg/m2     General appearance: alert, cooperative, no distress, appears stated age  Lungs: clear to auscultation bilaterally  Abdomen: Non-tender. Quiet bowel sounds. Masses present and stable.   Extremities: no cyanosis or edema  Neurologic: Grossly normal    Disposition: STR with plan to return home with 24 hour comfort care  Discharge Condition: stable  Patient Instructions:   Current Discharge Medication List      START taking these medications    Details   HYDROcodone-acetaminophen (NORCO) 5-325 mg per tablet Take 1 Tab by mouth every four (4) hours as needed. Max Daily Amount: 6 Tabs. Qty: 20 Tab, Refills: 0    Associated Diagnoses: Pelvic mass         CONTINUE these medications which have CHANGED    Details   clonazePAM (KLONOPIN) 0.5 mg tablet Take 1 Tab by mouth two (2) times a day. Max Daily Amount: 1 mg. Qty: 10 Tab, Refills: 0    Associated Diagnoses: Pelvic mass         CONTINUE these medications which have NOT CHANGED    Details   ondansetron (ZOFRAN ODT) 8 mg disintegrating tablet Take 8 mg by mouth every twelve (12) hours as needed for Nausea. psyllium (METAMUCIL) powd Take 1 Dose by mouth daily as needed for Diarrhea. lactulose (CHRONULAC) 10 gram/15 mL solution Take 15 mL by mouth three (3) times daily. Qty: 480 mL, Refills: 1    Associated Diagnoses: Constipation, unspecified constipation type      diclofenac EC (VOLTAREN) 50 mg EC tablet Take 1 Tab by mouth two (2) times a day. Qty: 14 Tab, Refills: 0      fexofenadine (ALLEGRA) 180 mg tablet Take 180 mg by mouth daily as needed for Allergies. diphenoxylate-atropine (LOMOTIL) 2.5-0.025 mg per tablet Take 1 Tab by mouth four (4) times daily as needed for Diarrhea. Max Daily Amount: 4 Tabs. Qty: 360 Tab, Refills: 1    Associated Diagnoses: Diarrhea, unspecified type      donepezil (ARICEPT) 10 mg tablet Take 1 Tab by mouth nightly. Qty: 15 Tab, Refills: 0      calcium citrate-vitamin D3 (CITRACAL + D) tablet Take 1 Tab by mouth daily as needed. aspirin 81 mg chewable tablet Take 81 mg by mouth daily. acetaminophen (TYLENOL) 325 mg tablet Take 325 mg by mouth every six (6) hours as needed for Pain.      ketotifen (ZADITOR) 0.025 % (0.035 %) ophthalmic solution Administer 1 Drop to both eyes daily as needed.       nystatin (MYCOSTATIN) powder Apply  to affected area three (3) times daily. Qty: 60 g, Refills: 2    Associated Diagnoses: Pelvic mass      lisinopril (PRINIVIL, ZESTRIL) 40 mg tablet Take 1 Tab by mouth daily. Qty: 90 Tab, Refills: 3      omeprazole (PRILOSEC) 40 mg capsule Take 1 Cap by mouth daily. Qty: 90 Cap, Refills: 3    Associated Diagnoses: Gastroesophageal reflux disease without esophagitis      citalopram (CELEXA) 20 mg tablet Take 1 Tab by mouth daily. Qty: 90 Tab, Refills: 3    Associated Diagnoses: Anxiety      potassium chloride SA (MICRO-K) 10 mEq capsule TAKE ONE CAPSULE BY MOUTH EVERY DAY FOR 90 DAYS  Qty: 90 Cap, Refills: 3      metoprolol succinate (TOPROL-XL) 50 mg XL tablet Take 1 Tab by mouth daily. Qty: 90 Tab, Refills: 3      buPROPion XL (WELLBUTRIN XL) 150 mg tablet Take 1 Tab by mouth every morning. Qty: 90 Tab, Refills: 3    Associated Diagnoses: Reactive depression      allopurinol (ZYLOPRIM) 100 mg tablet Take 1 Tab by mouth two (2) times a day. Qty: 180 Tab, Refills: 3    Associated Diagnoses: Podagra; Chronic gout involving toe without tophus, unspecified cause, unspecified laterality      furosemide (LASIX) 80 mg tablet Take 1 Tab by mouth daily. Qty: 90 Tab, Refills: 3    Associated Diagnoses: Localized edema      fluticasone (FLONASE) 50 mcg/actuation nasal spray Take 1 spray in each nostril daily  Qty: 3 Bottle, Refills: 3      Walker (ULTRA-LIGHT ROLLATOR) misc by Does Not Apply route. Sig: Use as directed      ibandronate (BONIVA) 150 mg tablet Take 150 mg by mouth every thirty (30) days.   Qty: 3 Tab, Refills: 3             Activity: Gradual and slow therapy  Diet: Regular Diet  Wound Care: None needed    Follow-up  ·   PCP in 3-5 days per home health care team  Time spent to discharge patient 35 minutes  Signed:  Varsha Aggarwal MD  6/8/2018  3:06 PM

## 2018-06-08 NOTE — PROGRESS NOTES
Shift assessment complete. Pt A&O to person and place. Respirations present. HOB elevated. Bed low and locked, call light within reach. Pt on bedrest. Brief on for incontinence, pt calls for bedpan as well. Cast and ACE wrap to R forearm. Bruising to R hip and R eye. Abdomen firm around umbilicus, umbilical hernia with underlying tumor present, mass protruding past level of abdomen. 2+ pitting edema to BLE. Small incision to RUQ of abdomen = clean, dry, intact. IV clean, dry, intact. Family at bedside. No complaints/concerns at this time.

## 2018-06-08 NOTE — PROGRESS NOTES
Discharge order placed by Dr. Bushra Machuca. Pt to go to [facility]. RN called report to [address] [place] [address] at [facility]. RN reiterated that pt will not have aggressive therapy and it was just to make her a little stronger to go home. IV dc and intact. Tech assisted with packing belongings. Dr. Bushra Machuca spoke with pt's son to inform him of transfer. [facility].

## 2018-06-08 NOTE — PROGRESS NOTES
Ramirez Phelps  [id number]  [id number].  06/07/18      Patient seen and examined. No complaints currently states she feels fine. AFVSS  Gen: WDWN, NAD  Abdomen: Soft, NTTP, Stable hernia. Ext: with some edema. 81 yo s/p fall with fractured arm and ovarian mass. Today we again reviewed previous cytology results and previous discussions regarding management. Discussed options. Again reviewed risks and benefits of surgical management and previous discussions regarding intervention. Again family/patient recalled discussions previously had in the context of her deconditioning and cardiac health. Mrs. Padilla and her family reiterated their preference for minimal intervention and focusing on improving her strength and recovering from her recent fall as well as focusing on comfort. They are planning for discharge to rehab.

## 2018-06-08 NOTE — PROGRESS NOTES
TRANSFER - OUT REPORT:    Verbal report given to Brittny(name) on Richard Randle  being transferred to [facility]unit) for routine progression of care       Report consisted of patients Situation, Background, Assessment and   Recommendations(SBAR). Information from the following report(s) SBAR, Kardex, [facility] and Recent Results was reviewed with the receiving nurse. Lines:   Peripheral IV 06/07/18 Left; Inferior Forearm (Active)   Site Assessment Clean, dry, & intact 6/8/2018 11:12 AM   Phlebitis Assessment 0 6/8/2018 11:12 AM   Infiltration Assessment 0 6/8/2018 11:12 AM   Dressing Status Clean, dry, & intact 6/8/2018 11:12 AM   Dressing Type Transparent 6/8/2018 11:12 AM   Hub Color/Line Status Capped 6/8/2018  2:44 AM        Opportunity for questions and clarification was provided.       Patient transported with:   O2 @ 1 liters

## 2018-06-08 NOTE — PROGRESS NOTES
ELMIRA called [place] with [facility] to confirm that the patient had a bed if medically ready to discharge today. LVM.      Nimco Zhou, [address] [facility]    [address] [place] [address]  [phone]

## 2018-06-08 NOTE — PROGRESS NOTES
Problem: Pressure Injury - Risk of  Goal: *Prevention of pressure injury  Document Perico Scale and appropriate interventions in the flowsheet.    Outcome: Progressing Towards Goal  Pressure Injury Interventions:  Sensory Interventions: Assess changes in LOC, Float heels, Keep linens dry and wrinkle-free, Minimize linen layers    Moisture Interventions: Absorbent underpads, Check for incontinence Q2 hours and as needed    Activity Interventions: Assess need for specialty bed, Pressure redistribution bed/mattress(bed type), PT/OT evaluation    Mobility Interventions: Assess need for specialty bed, Float heels, HOB 30 degrees or less    Nutrition Interventions: Document food/fluid/supplement intake, Discuss nutritional consult with provider, Offer support with meals,snacks and hydration    Friction and Shear Interventions: HOB 30 degrees or less, Minimize layers

## 2018-06-08 NOTE — PROGRESS NOTES
Tri Pedroza from [facility] called CM and advised her that the patient has a room confirmed for today. Patient will be in room 216A. ELMIRA called [facility] ambulance and arranged for transport at 3 pm. Packet prepared and number for report provided to RN.      Neida Calixto, [address] [facility]    [address] [facility]  [phone]

## 2018-06-08 NOTE — PROGRESS NOTES
Pt A+Ox4, no c/o today. Pt with 1+ swelling in left leg and prevalon boots placed on bilateral legs. Pt on 1L O2 NC. Wbc increased today to 37.5 and BUN/CR rising. CXR, UA, and blood cultures ordered by Dr. Bushra Machuca. Lasix also held. Pt with family at bedside.

## 2018-06-08 NOTE — PROGRESS NOTES
CM faxed discharge summary to [facility].      Filipe Abdi, [address] [facility]    [address] [facility]  [phone]

## 2018-06-11 ENCOUNTER — PATIENT OUTREACH (OUTPATIENT)
Dept: CASE MANAGEMENT | Age: 83
End: 2018-06-11

## 2018-06-11 NOTE — PROGRESS NOTES
Per [facility] patient discharged to [facility] a Preferred Provider Karen on 06/08/2018. Patient will be included in weekly care coordination calls, Care Coordinator will send patient discharge disposition to Jody Kelsey RN [facility]. This note will not be viewable in [address]

## 2018-06-13 LAB
BACTERIA SPEC CULT: NORMAL
SERVICE CMNT-IMP: NORMAL

## 2018-06-15 ENCOUNTER — HOSPITAL ENCOUNTER (OUTPATIENT)
Dept: INFUSION THERAPY | Age: 83
Discharge: HOME | End: 2018-06-15
Payer: MEDICARE

## 2018-06-15 VITALS
TEMPERATURE: 98.5 F | RESPIRATION RATE: 16 BRPM | OXYGEN SATURATION: 94 % | HEART RATE: 57 BPM | DIASTOLIC BLOOD PRESSURE: 54 MMHG | SYSTOLIC BLOOD PRESSURE: 140 MMHG

## 2018-06-15 PROCEDURE — 74011250636 HC RX REV CODE- 250/636

## 2018-06-15 PROCEDURE — P9016 RBC LEUKOCYTES REDUCED: HCPCS

## 2018-06-15 PROCEDURE — 77030018667 ADMN ST IV BLD FENW -A

## 2018-06-15 PROCEDURE — 86901 BLOOD TYPING SEROLOGIC RH(D): CPT

## 2018-06-15 PROCEDURE — 86920 COMPATIBILITY TEST SPIN: CPT

## 2018-06-15 PROCEDURE — 36430 TRANSFUSION BLD/BLD COMPNT: CPT

## 2018-06-15 RX ORDER — SODIUM CHLORIDE 9 MG/ML
250 INJECTION, SOLUTION INTRAVENOUS AS NEEDED
Status: DISCONTINUED | OUTPATIENT
Start: 2018-06-15 | End: 2018-06-19 | Stop reason: HOSPADM

## 2018-06-15 RX ORDER — SODIUM CHLORIDE 0.9 % (FLUSH) 0.9 %
10 SYRINGE (ML) INJECTION AS NEEDED
Status: DISCONTINUED | OUTPATIENT
Start: 2018-06-15 | End: 2018-06-19 | Stop reason: HOSPADM

## 2018-06-15 RX ORDER — ENOXAPARIN SODIUM 100 MG/ML
60 INJECTION SUBCUTANEOUS 2 TIMES DAILY
COMMUNITY

## 2018-06-15 RX ADMIN — SODIUM CHLORIDE 250 ML: 900 INJECTION, SOLUTION INTRAVENOUS at 16:36

## 2018-06-15 NOTE — PROGRESS NOTES
Arrived to the [facility]. 2 units PRBC completed. Patient tolerated without difficulty. Private duty nurse and sitter from [facility] present at bedside to assist with changing and turning every 2 hours, patient required 3-4 people to assist with changing. Patient assisted by private duty RN with eating.   No future appointments at this time   Discharged to [facility] on stretcher

## 2018-06-16 LAB
ABO + RH BLD: NORMAL
BLD PROD TYP BPU: NORMAL
BLD PROD TYP BPU: NORMAL
BLOOD GROUP ANTIBODIES SERPL: NORMAL
BPU ID: NORMAL
BPU ID: NORMAL
CROSSMATCH RESULT,%XM: NORMAL
CROSSMATCH RESULT,%XM: NORMAL
SPECIMEN EXP DATE BLD: NORMAL
STATUS OF UNIT,%ST: NORMAL
STATUS OF UNIT,%ST: NORMAL
UNIT DIVISION, %UDIV: 0
UNIT DIVISION, %UDIV: 0

## 2019-09-24 PROBLEM — Z01.810 PRE-OPERATIVE CARDIOVASCULAR EXAMINATION: Status: RESOLVED | Noted: 2018-01-24 | Resolved: 2019-09-24
